# Patient Record
Sex: MALE | Race: WHITE | NOT HISPANIC OR LATINO | Employment: OTHER | URBAN - METROPOLITAN AREA
[De-identification: names, ages, dates, MRNs, and addresses within clinical notes are randomized per-mention and may not be internally consistent; named-entity substitution may affect disease eponyms.]

---

## 2021-12-10 ENCOUNTER — TELEPHONE (OUTPATIENT)
Dept: FAMILY MEDICINE CLINIC | Facility: CLINIC | Age: 51
End: 2021-12-10

## 2021-12-12 ENCOUNTER — APPOINTMENT (OUTPATIENT)
Dept: ULTRASOUND IMAGING | Facility: HOSPITAL | Age: 51
End: 2021-12-12

## 2021-12-12 ENCOUNTER — HOSPITAL ENCOUNTER (EMERGENCY)
Facility: HOSPITAL | Age: 51
Discharge: HOME | End: 2021-12-12
Admitting: EMERGENCY MEDICINE

## 2021-12-12 VITALS
HEIGHT: 71 IN | DIASTOLIC BLOOD PRESSURE: 93 MMHG | OXYGEN SATURATION: 98 % | WEIGHT: 237.88 LBS | HEART RATE: 84 BPM | TEMPERATURE: 98 F | BODY MASS INDEX: 33.3 KG/M2 | SYSTOLIC BLOOD PRESSURE: 122 MMHG | RESPIRATION RATE: 15 BRPM

## 2021-12-12 DIAGNOSIS — N45.1 LEFT EPIDIDYMITIS: Primary | ICD-10-CM

## 2021-12-12 LAB
ANION GAP SERPL CALCULATED.3IONS-SCNC: 12 MMOL/L (ref 5–15)
BACTERIA UR QL AUTO: ABNORMAL /HPF
BASOPHILS # BLD AUTO: 0.1 10*3/MM3 (ref 0–0.2)
BASOPHILS NFR BLD AUTO: 0.7 % (ref 0–1.5)
BILIRUB UR QL STRIP: NEGATIVE
BUN SERPL-MCNC: 13 MG/DL (ref 6–20)
BUN/CREAT SERPL: 15.3 (ref 7–25)
C TRACH RRNA CVX QL NAA+PROBE: NOT DETECTED
CALCIUM SPEC-SCNC: 8.8 MG/DL (ref 8.6–10.5)
CHLORIDE SERPL-SCNC: 101 MMOL/L (ref 98–107)
CLARITY UR: CLEAR
CO2 SERPL-SCNC: 26 MMOL/L (ref 22–29)
COLOR UR: YELLOW
CREAT SERPL-MCNC: 0.85 MG/DL (ref 0.76–1.27)
DEPRECATED RDW RBC AUTO: 41.6 FL (ref 37–54)
EOSINOPHIL # BLD AUTO: 0.2 10*3/MM3 (ref 0–0.4)
EOSINOPHIL NFR BLD AUTO: 1.6 % (ref 0.3–6.2)
ERYTHROCYTE [DISTWIDTH] IN BLOOD BY AUTOMATED COUNT: 14.1 % (ref 12.3–15.4)
GFR SERPL CREATININE-BSD FRML MDRD: 95 ML/MIN/1.73
GLUCOSE SERPL-MCNC: 182 MG/DL (ref 65–99)
GLUCOSE UR STRIP-MCNC: ABNORMAL MG/DL
HCT VFR BLD AUTO: 41.4 % (ref 37.5–51)
HGB BLD-MCNC: 14 G/DL (ref 13–17.7)
HGB UR QL STRIP.AUTO: ABNORMAL
HYALINE CASTS UR QL AUTO: ABNORMAL /LPF
KETONES UR QL STRIP: NEGATIVE
LEUKOCYTE ESTERASE UR QL STRIP.AUTO: ABNORMAL
LYMPHOCYTES # BLD AUTO: 2.6 10*3/MM3 (ref 0.7–3.1)
LYMPHOCYTES NFR BLD AUTO: 20 % (ref 19.6–45.3)
MCH RBC QN AUTO: 28.2 PG (ref 26.6–33)
MCHC RBC AUTO-ENTMCNC: 33.8 G/DL (ref 31.5–35.7)
MCV RBC AUTO: 83.5 FL (ref 79–97)
MONOCYTES # BLD AUTO: 1.2 10*3/MM3 (ref 0.1–0.9)
MONOCYTES NFR BLD AUTO: 9.1 % (ref 5–12)
N GONORRHOEA RRNA SPEC QL NAA+PROBE: NOT DETECTED
NEUTROPHILS NFR BLD AUTO: 68.6 % (ref 42.7–76)
NEUTROPHILS NFR BLD AUTO: 8.7 10*3/MM3 (ref 1.7–7)
NITRITE UR QL STRIP: NEGATIVE
NRBC BLD AUTO-RTO: 0.1 /100 WBC (ref 0–0.2)
PH UR STRIP.AUTO: 6 [PH] (ref 5–8)
PLATELET # BLD AUTO: 242 10*3/MM3 (ref 140–450)
PMV BLD AUTO: 7.6 FL (ref 6–12)
POTASSIUM SERPL-SCNC: 4 MMOL/L (ref 3.5–5.2)
PROT UR QL STRIP: ABNORMAL
RBC # BLD AUTO: 4.96 10*6/MM3 (ref 4.14–5.8)
RBC # UR STRIP: ABNORMAL /HPF
REF LAB TEST METHOD: ABNORMAL
SODIUM SERPL-SCNC: 139 MMOL/L (ref 136–145)
SP GR UR STRIP: 1.02 (ref 1–1.03)
SQUAMOUS #/AREA URNS HPF: ABNORMAL /HPF
UROBILINOGEN UR QL STRIP: ABNORMAL
WBC # UR STRIP: ABNORMAL /HPF
WBC NRBC COR # BLD: 12.8 10*3/MM3 (ref 3.4–10.8)

## 2021-12-12 PROCEDURE — 0 MORPHINE SULFATE 4 MG/ML SOLUTION: Performed by: NURSE PRACTITIONER

## 2021-12-12 PROCEDURE — 80048 BASIC METABOLIC PNL TOTAL CA: CPT | Performed by: NURSE PRACTITIONER

## 2021-12-12 PROCEDURE — 81001 URINALYSIS AUTO W/SCOPE: CPT | Performed by: NURSE PRACTITIONER

## 2021-12-12 PROCEDURE — 96374 THER/PROPH/DIAG INJ IV PUSH: CPT

## 2021-12-12 PROCEDURE — 99283 EMERGENCY DEPT VISIT LOW MDM: CPT

## 2021-12-12 PROCEDURE — 87591 N.GONORRHOEAE DNA AMP PROB: CPT | Performed by: NURSE PRACTITIONER

## 2021-12-12 PROCEDURE — 87491 CHLMYD TRACH DNA AMP PROBE: CPT | Performed by: NURSE PRACTITIONER

## 2021-12-12 PROCEDURE — 87086 URINE CULTURE/COLONY COUNT: CPT | Performed by: NURSE PRACTITIONER

## 2021-12-12 PROCEDURE — 25010000002 ONDANSETRON PER 1 MG: Performed by: NURSE PRACTITIONER

## 2021-12-12 PROCEDURE — 96375 TX/PRO/DX INJ NEW DRUG ADDON: CPT

## 2021-12-12 PROCEDURE — 93976 VASCULAR STUDY: CPT

## 2021-12-12 PROCEDURE — 76870 US EXAM SCROTUM: CPT

## 2021-12-12 PROCEDURE — 85025 COMPLETE CBC W/AUTO DIFF WBC: CPT | Performed by: NURSE PRACTITIONER

## 2021-12-12 RX ORDER — HYDROCODONE BITARTRATE AND ACETAMINOPHEN 5; 325 MG/1; MG/1
1 TABLET ORAL EVERY 4 HOURS PRN
Qty: 15 TABLET | Refills: 0 | Status: SHIPPED | OUTPATIENT
Start: 2021-12-12

## 2021-12-12 RX ORDER — SODIUM CHLORIDE 0.9 % (FLUSH) 0.9 %
10 SYRINGE (ML) INJECTION AS NEEDED
Status: DISCONTINUED | OUTPATIENT
Start: 2021-12-12 | End: 2021-12-12 | Stop reason: HOSPADM

## 2021-12-12 RX ORDER — ONDANSETRON 2 MG/ML
4 INJECTION INTRAMUSCULAR; INTRAVENOUS ONCE
Status: COMPLETED | OUTPATIENT
Start: 2021-12-12 | End: 2021-12-12

## 2021-12-12 RX ORDER — LEVOFLOXACIN 750 MG/1
750 TABLET ORAL
Status: COMPLETED | OUTPATIENT
Start: 2021-12-12 | End: 2021-12-12

## 2021-12-12 RX ORDER — MORPHINE SULFATE 4 MG/ML
4 INJECTION, SOLUTION INTRAMUSCULAR; INTRAVENOUS ONCE
Status: COMPLETED | OUTPATIENT
Start: 2021-12-12 | End: 2021-12-12

## 2021-12-12 RX ORDER — HYDROCODONE BITARTRATE AND ACETAMINOPHEN 7.5; 325 MG/1; MG/1
1 TABLET ORAL ONCE
Status: COMPLETED | OUTPATIENT
Start: 2021-12-12 | End: 2021-12-12

## 2021-12-12 RX ORDER — CIPROFLOXACIN 500 MG/1
500 TABLET, FILM COATED ORAL 2 TIMES DAILY
Qty: 20 TABLET | Refills: 0 | Status: SHIPPED | OUTPATIENT
Start: 2021-12-12

## 2021-12-12 RX ADMIN — MORPHINE SULFATE 4 MG: 4 INJECTION INTRAVENOUS at 05:03

## 2021-12-12 RX ADMIN — HYDROCODONE BITARTRATE AND ACETAMINOPHEN 1 TABLET: 7.5; 325 TABLET ORAL at 07:03

## 2021-12-12 RX ADMIN — ONDANSETRON 4 MG: 2 INJECTION INTRAMUSCULAR; INTRAVENOUS at 05:03

## 2021-12-12 RX ADMIN — LEVOFLOXACIN 750 MG: 750 TABLET, FILM COATED ORAL at 07:02

## 2021-12-13 LAB — BACTERIA SPEC AEROBE CULT: NO GROWTH

## 2021-12-20 ENCOUNTER — OFFICE VISIT (OUTPATIENT)
Dept: FAMILY MEDICINE CLINIC | Facility: CLINIC | Age: 51
End: 2021-12-20

## 2021-12-20 VITALS
HEART RATE: 87 BPM | SYSTOLIC BLOOD PRESSURE: 123 MMHG | BODY MASS INDEX: 33.33 KG/M2 | TEMPERATURE: 97.3 F | DIASTOLIC BLOOD PRESSURE: 83 MMHG | WEIGHT: 239 LBS | OXYGEN SATURATION: 98 %

## 2021-12-20 DIAGNOSIS — N45.1 EPIDIDYMITIS, LEFT: Primary | ICD-10-CM

## 2021-12-20 PROCEDURE — 99213 OFFICE O/P EST LOW 20 MIN: CPT | Performed by: FAMILY MEDICINE

## 2024-05-08 ENCOUNTER — OFFICE VISIT (OUTPATIENT)
Dept: FAMILY MEDICINE CLINIC | Facility: CLINIC | Age: 54
End: 2024-05-08
Payer: COMMERCIAL

## 2024-05-08 VITALS
TEMPERATURE: 98 F | DIASTOLIC BLOOD PRESSURE: 89 MMHG | HEART RATE: 82 BPM | SYSTOLIC BLOOD PRESSURE: 126 MMHG | OXYGEN SATURATION: 96 % | WEIGHT: 221 LBS | BODY MASS INDEX: 30.82 KG/M2

## 2024-05-08 DIAGNOSIS — R10.13 EPIGASTRIC PAIN: ICD-10-CM

## 2024-05-08 DIAGNOSIS — T78.40XA ALLERGY, INITIAL ENCOUNTER: Primary | ICD-10-CM

## 2024-05-08 PROCEDURE — 99214 OFFICE O/P EST MOD 30 MIN: CPT | Performed by: NURSE PRACTITIONER

## 2024-05-08 RX ORDER — ALBUTEROL SULFATE 90 UG/1
2 AEROSOL, METERED RESPIRATORY (INHALATION) AS NEEDED
COMMUNITY
Start: 2016-05-18

## 2024-05-08 RX ORDER — OMEPRAZOLE 40 MG/1
40 CAPSULE, DELAYED RELEASE ORAL DAILY
Qty: 90 CAPSULE | Refills: 1 | Status: SHIPPED | OUTPATIENT
Start: 2024-05-08

## 2024-05-08 RX ORDER — EPINEPHRINE 0.3 MG/.3ML
0.3 INJECTION SUBCUTANEOUS ONCE
Qty: 1 EACH | Refills: 0 | Status: SHIPPED | OUTPATIENT
Start: 2024-05-08 | End: 2024-05-08

## 2024-05-08 RX ORDER — MONTELUKAST SODIUM 10 MG/1
10 TABLET ORAL NIGHTLY
Qty: 90 TABLET | Refills: 1 | Status: SHIPPED | OUTPATIENT
Start: 2024-05-08

## 2024-05-08 RX ORDER — LISINOPRIL 10 MG/1
10 TABLET ORAL DAILY
COMMUNITY
Start: 2017-10-16

## 2024-05-08 RX ORDER — LORAZEPAM 1 MG/1
1 TABLET ORAL AS NEEDED
COMMUNITY
Start: 2015-08-26

## 2024-05-08 RX ORDER — OLOPATADINE HYDROCHLORIDE 1 MG/ML
1 SOLUTION/ DROPS OPHTHALMIC 2 TIMES DAILY PRN
Qty: 5 ML | Refills: 12 | Status: SHIPPED | OUTPATIENT
Start: 2024-05-08

## 2024-05-18 ENCOUNTER — PATIENT MESSAGE (OUTPATIENT)
Dept: FAMILY MEDICINE CLINIC | Facility: CLINIC | Age: 54
End: 2024-05-18

## 2024-05-19 RX ORDER — ACYCLOVIR 400 MG/1
1 TABLET ORAL
Qty: 3 EACH | Refills: 5 | Status: SHIPPED | OUTPATIENT
Start: 2024-05-19

## 2024-05-29 ENCOUNTER — LAB (OUTPATIENT)
Dept: FAMILY MEDICINE CLINIC | Facility: CLINIC | Age: 54
End: 2024-05-29
Payer: COMMERCIAL

## 2024-05-29 ENCOUNTER — OFFICE VISIT (OUTPATIENT)
Dept: FAMILY MEDICINE CLINIC | Facility: CLINIC | Age: 54
End: 2024-05-29
Payer: COMMERCIAL

## 2024-05-29 VITALS
HEIGHT: 71 IN | DIASTOLIC BLOOD PRESSURE: 88 MMHG | WEIGHT: 222.3 LBS | BODY MASS INDEX: 31.12 KG/M2 | SYSTOLIC BLOOD PRESSURE: 136 MMHG | OXYGEN SATURATION: 97 % | HEART RATE: 75 BPM | TEMPERATURE: 98.3 F

## 2024-05-29 DIAGNOSIS — Z12.5 ENCOUNTER FOR SCREENING FOR MALIGNANT NEOPLASM OF PROSTATE: ICD-10-CM

## 2024-05-29 DIAGNOSIS — Z00.00 ENCOUNTER FOR WELL ADULT EXAM WITHOUT ABNORMAL FINDINGS: Primary | ICD-10-CM

## 2024-05-29 DIAGNOSIS — Z12.11 SCREENING FOR COLON CANCER: ICD-10-CM

## 2024-05-29 DIAGNOSIS — E11.9 TYPE 2 DIABETES MELLITUS WITHOUT COMPLICATION, WITHOUT LONG-TERM CURRENT USE OF INSULIN: ICD-10-CM

## 2024-05-29 LAB
ALBUMIN SERPL-MCNC: 4.5 G/DL (ref 3.5–5.2)
ALBUMIN/GLOB SERPL: 1.5 G/DL
ALP SERPL-CCNC: 80 U/L (ref 39–117)
ALT SERPL W P-5'-P-CCNC: 13 U/L (ref 1–41)
ANION GAP SERPL CALCULATED.3IONS-SCNC: 13 MMOL/L (ref 5–15)
AST SERPL-CCNC: 11 U/L (ref 1–40)
BILIRUB SERPL-MCNC: 0.2 MG/DL (ref 0–1.2)
BUN SERPL-MCNC: 10 MG/DL (ref 6–20)
BUN/CREAT SERPL: 10.8 (ref 7–25)
CALCIUM SPEC-SCNC: 9.8 MG/DL (ref 8.6–10.5)
CHLORIDE SERPL-SCNC: 100 MMOL/L (ref 98–107)
CHOLEST SERPL-MCNC: 203 MG/DL (ref 0–200)
CO2 SERPL-SCNC: 27 MMOL/L (ref 22–29)
CREAT SERPL-MCNC: 0.93 MG/DL (ref 0.76–1.27)
EGFRCR SERPLBLD CKD-EPI 2021: 98.8 ML/MIN/1.73
GLOBULIN UR ELPH-MCNC: 3.1 GM/DL
GLUCOSE SERPL-MCNC: 142 MG/DL (ref 65–99)
HBA1C MFR BLD: 6.7 % (ref 4.8–5.6)
HCV AB SER QL: NORMAL
HDLC SERPL-MCNC: 33 MG/DL (ref 40–60)
LDLC SERPL CALC-MCNC: 145 MG/DL (ref 0–100)
LDLC/HDLC SERPL: 4.32 {RATIO}
POTASSIUM SERPL-SCNC: 4.2 MMOL/L (ref 3.5–5.2)
PROT SERPL-MCNC: 7.6 G/DL (ref 6–8.5)
PSA SERPL-MCNC: 0.41 NG/ML (ref 0–4)
SODIUM SERPL-SCNC: 140 MMOL/L (ref 136–145)
TRIGL SERPL-MCNC: 138 MG/DL (ref 0–150)
VLDLC SERPL-MCNC: 25 MG/DL (ref 5–40)

## 2024-05-29 PROCEDURE — G0103 PSA SCREENING: HCPCS | Performed by: FAMILY MEDICINE

## 2024-05-29 PROCEDURE — 84403 ASSAY OF TOTAL TESTOSTERONE: CPT | Performed by: FAMILY MEDICINE

## 2024-05-29 PROCEDURE — 83036 HEMOGLOBIN GLYCOSYLATED A1C: CPT | Performed by: FAMILY MEDICINE

## 2024-05-29 PROCEDURE — 99396 PREV VISIT EST AGE 40-64: CPT | Performed by: FAMILY MEDICINE

## 2024-05-29 PROCEDURE — 80053 COMPREHEN METABOLIC PANEL: CPT | Performed by: FAMILY MEDICINE

## 2024-05-29 PROCEDURE — 36415 COLL VENOUS BLD VENIPUNCTURE: CPT | Performed by: FAMILY MEDICINE

## 2024-05-29 PROCEDURE — 86803 HEPATITIS C AB TEST: CPT | Performed by: FAMILY MEDICINE

## 2024-05-29 PROCEDURE — 80061 LIPID PANEL: CPT | Performed by: FAMILY MEDICINE

## 2024-05-29 PROCEDURE — 90471 IMMUNIZATION ADMIN: CPT | Performed by: FAMILY MEDICINE

## 2024-05-29 PROCEDURE — 84402 ASSAY OF FREE TESTOSTERONE: CPT | Performed by: FAMILY MEDICINE

## 2024-05-29 PROCEDURE — 90715 TDAP VACCINE 7 YRS/> IM: CPT | Performed by: FAMILY MEDICINE

## 2024-05-29 RX ORDER — EPINEPHRINE 0.3 MG/.3ML
INJECTION SUBCUTANEOUS
COMMUNITY
Start: 2024-05-08

## 2024-06-04 LAB
TESTOST FREE SERPL-MCNC: 7 PG/ML (ref 7.2–24)
TESTOST SERPL-MCNC: 273.2 NG/DL (ref 264–916)

## 2025-01-31 ENCOUNTER — APPOINTMENT (OUTPATIENT)
Dept: CT IMAGING | Facility: HOSPITAL | Age: 55
End: 2025-01-31

## 2025-01-31 ENCOUNTER — HOSPITAL ENCOUNTER (OUTPATIENT)
Facility: HOSPITAL | Age: 55
Setting detail: OBSERVATION
Discharge: HOME | End: 2025-02-01
Attending: EMERGENCY MEDICINE | Admitting: EMERGENCY MEDICINE

## 2025-01-31 DIAGNOSIS — R11.0 NAUSEA: ICD-10-CM

## 2025-01-31 DIAGNOSIS — R10.13 EPIGASTRIC ABDOMINAL PAIN: ICD-10-CM

## 2025-01-31 DIAGNOSIS — K80.20 CALCULUS OF GALLBLADDER WITHOUT CHOLECYSTITIS WITHOUT OBSTRUCTION: Primary | ICD-10-CM

## 2025-01-31 LAB
ALBUMIN SERPL-MCNC: 4.5 G/DL (ref 3.5–5.2)
ALBUMIN/GLOB SERPL: 1.3 G/DL
ALP SERPL-CCNC: 84 U/L (ref 39–117)
ALT SERPL W P-5'-P-CCNC: 14 U/L (ref 1–41)
ANION GAP SERPL CALCULATED.3IONS-SCNC: 13.9 MMOL/L (ref 5–15)
AST SERPL-CCNC: 21 U/L (ref 1–40)
BASOPHILS # BLD AUTO: 0.1 10*3/MM3 (ref 0–0.2)
BASOPHILS NFR BLD AUTO: 0.8 % (ref 0–1.5)
BILIRUB SERPL-MCNC: 0.4 MG/DL (ref 0–1.2)
BILIRUB UR QL STRIP: NEGATIVE
BUN SERPL-MCNC: 11 MG/DL (ref 6–20)
BUN/CREAT SERPL: 10.7 (ref 7–25)
CALCIUM SPEC-SCNC: 10 MG/DL (ref 8.6–10.5)
CHLORIDE SERPL-SCNC: 98 MMOL/L (ref 98–107)
CLARITY UR: CLEAR
CO2 SERPL-SCNC: 24.1 MMOL/L (ref 22–29)
COLOR UR: YELLOW
CREAT SERPL-MCNC: 1.03 MG/DL (ref 0.76–1.27)
DEPRECATED RDW RBC AUTO: 40.9 FL (ref 37–54)
EGFRCR SERPLBLD CKD-EPI 2021: 86.9 ML/MIN/1.73
EOSINOPHIL # BLD AUTO: 0.12 10*3/MM3 (ref 0–0.4)
EOSINOPHIL NFR BLD AUTO: 1 % (ref 0.3–6.2)
ERYTHROCYTE [DISTWIDTH] IN BLOOD BY AUTOMATED COUNT: 13.2 % (ref 12.3–15.4)
GEN 5 1HR TROPONIN T REFLEX: 10 NG/L
GLOBULIN UR ELPH-MCNC: 3.4 GM/DL
GLUCOSE BLDC GLUCOMTR-MCNC: 118 MG/DL (ref 70–105)
GLUCOSE BLDC GLUCOMTR-MCNC: 146 MG/DL (ref 70–105)
GLUCOSE BLDC GLUCOMTR-MCNC: 162 MG/DL (ref 70–105)
GLUCOSE SERPL-MCNC: 204 MG/DL (ref 65–99)
GLUCOSE UR STRIP-MCNC: NEGATIVE MG/DL
HBA1C MFR BLD: 7.43 % (ref 4.8–5.6)
HCT VFR BLD AUTO: 45.2 % (ref 37.5–51)
HGB BLD-MCNC: 15 G/DL (ref 13–17.7)
HGB UR QL STRIP.AUTO: NEGATIVE
HOLD SPECIMEN: NORMAL
HOLD SPECIMEN: NORMAL
IMM GRANULOCYTES # BLD AUTO: 0.03 10*3/MM3 (ref 0–0.05)
IMM GRANULOCYTES NFR BLD AUTO: 0.2 % (ref 0–0.5)
KETONES UR QL STRIP: NEGATIVE
LEUKOCYTE ESTERASE UR QL STRIP.AUTO: NEGATIVE
LIPASE SERPL-CCNC: 19 U/L (ref 13–60)
LYMPHOCYTES # BLD AUTO: 1.37 10*3/MM3 (ref 0.7–3.1)
LYMPHOCYTES NFR BLD AUTO: 11.1 % (ref 19.6–45.3)
MCH RBC QN AUTO: 28 PG (ref 26.6–33)
MCHC RBC AUTO-ENTMCNC: 33.2 G/DL (ref 31.5–35.7)
MCV RBC AUTO: 84.3 FL (ref 79–97)
MONOCYTES # BLD AUTO: 0.81 10*3/MM3 (ref 0.1–0.9)
MONOCYTES NFR BLD AUTO: 6.6 % (ref 5–12)
NEUTROPHILS NFR BLD AUTO: 80.3 % (ref 42.7–76)
NEUTROPHILS NFR BLD AUTO: 9.86 10*3/MM3 (ref 1.7–7)
NITRITE UR QL STRIP: NEGATIVE
NRBC BLD AUTO-RTO: 0 /100 WBC (ref 0–0.2)
PH UR STRIP.AUTO: 7 [PH] (ref 5–8)
PLATELET # BLD AUTO: 264 10*3/MM3 (ref 140–450)
PMV BLD AUTO: 9.6 FL (ref 6–12)
POTASSIUM SERPL-SCNC: 4.4 MMOL/L (ref 3.5–5.2)
PROT SERPL-MCNC: 7.9 G/DL (ref 6–8.5)
PROT UR QL STRIP: NEGATIVE
RBC # BLD AUTO: 5.36 10*6/MM3 (ref 4.14–5.8)
SODIUM SERPL-SCNC: 136 MMOL/L (ref 136–145)
SP GR UR STRIP: 1.03 (ref 1–1.03)
TROPONIN T NUMERIC DELTA: 3 NG/L
TROPONIN T SERPL HS-MCNC: 7 NG/L
UROBILINOGEN UR QL STRIP: ABNORMAL
WBC NRBC COR # BLD AUTO: 12.29 10*3/MM3 (ref 3.4–10.8)
WHOLE BLOOD HOLD COAG: NORMAL
WHOLE BLOOD HOLD SPECIMEN: NORMAL

## 2025-01-31 PROCEDURE — 96376 TX/PRO/DX INJ SAME DRUG ADON: CPT

## 2025-01-31 PROCEDURE — 25010000002 HYDROMORPHONE PER 4 MG: Performed by: NURSE PRACTITIONER

## 2025-01-31 PROCEDURE — 96368 THER/DIAG CONCURRENT INF: CPT

## 2025-01-31 PROCEDURE — G0378 HOSPITAL OBSERVATION PER HR: HCPCS

## 2025-01-31 PROCEDURE — 25010000002 HYDROMORPHONE PER 4 MG: Performed by: PHYSICIAN ASSISTANT

## 2025-01-31 PROCEDURE — 80053 COMPREHEN METABOLIC PANEL: CPT | Performed by: PHYSICIAN ASSISTANT

## 2025-01-31 PROCEDURE — 93005 ELECTROCARDIOGRAM TRACING: CPT | Performed by: PHYSICIAN ASSISTANT

## 2025-01-31 PROCEDURE — 99204 OFFICE O/P NEW MOD 45 MIN: CPT | Performed by: STUDENT IN AN ORGANIZED HEALTH CARE EDUCATION/TRAINING PROGRAM

## 2025-01-31 PROCEDURE — 25010000002 HYDROMORPHONE 1 MG/ML SOLUTION: Performed by: PHYSICIAN ASSISTANT

## 2025-01-31 PROCEDURE — 96375 TX/PRO/DX INJ NEW DRUG ADDON: CPT

## 2025-01-31 PROCEDURE — 25010000002 CEFTRIAXONE PER 250 MG: Performed by: STUDENT IN AN ORGANIZED HEALTH CARE EDUCATION/TRAINING PROGRAM

## 2025-01-31 PROCEDURE — 85025 COMPLETE CBC W/AUTO DIFF WBC: CPT | Performed by: PHYSICIAN ASSISTANT

## 2025-01-31 PROCEDURE — 74177 CT ABD & PELVIS W/CONTRAST: CPT

## 2025-01-31 PROCEDURE — 81003 URINALYSIS AUTO W/O SCOPE: CPT | Performed by: PHYSICIAN ASSISTANT

## 2025-01-31 PROCEDURE — 25010000002 PROCHLORPERAZINE 10 MG/2ML SOLUTION: Performed by: PHYSICIAN ASSISTANT

## 2025-01-31 PROCEDURE — 25510000001 IOPAMIDOL PER 1 ML: Performed by: PHYSICIAN ASSISTANT

## 2025-01-31 PROCEDURE — 36415 COLL VENOUS BLD VENIPUNCTURE: CPT

## 2025-01-31 PROCEDURE — 83036 HEMOGLOBIN GLYCOSYLATED A1C: CPT | Performed by: NURSE PRACTITIONER

## 2025-01-31 PROCEDURE — 25010000002 METRONIDAZOLE 500 MG/100ML SOLUTION: Performed by: STUDENT IN AN ORGANIZED HEALTH CARE EDUCATION/TRAINING PROGRAM

## 2025-01-31 PROCEDURE — 25010000002 ONDANSETRON PER 1 MG: Performed by: PHYSICIAN ASSISTANT

## 2025-01-31 PROCEDURE — 82948 REAGENT STRIP/BLOOD GLUCOSE: CPT

## 2025-01-31 PROCEDURE — 83690 ASSAY OF LIPASE: CPT | Performed by: PHYSICIAN ASSISTANT

## 2025-01-31 PROCEDURE — 84484 ASSAY OF TROPONIN QUANT: CPT | Performed by: PHYSICIAN ASSISTANT

## 2025-01-31 PROCEDURE — 99285 EMERGENCY DEPT VISIT HI MDM: CPT

## 2025-01-31 PROCEDURE — 25010000002 DIPHENHYDRAMINE PER 50 MG: Performed by: PHYSICIAN ASSISTANT

## 2025-01-31 PROCEDURE — 96365 THER/PROPH/DIAG IV INF INIT: CPT

## 2025-01-31 RX ORDER — AMOXICILLIN 250 MG
2 CAPSULE ORAL 2 TIMES DAILY PRN
Status: DISCONTINUED | OUTPATIENT
Start: 2025-01-31 | End: 2025-02-01 | Stop reason: HOSPADM

## 2025-01-31 RX ORDER — HYDROMORPHONE HYDROCHLORIDE 1 MG/ML
0.5 INJECTION, SOLUTION INTRAMUSCULAR; INTRAVENOUS; SUBCUTANEOUS
Status: DISCONTINUED | OUTPATIENT
Start: 2025-01-31 | End: 2025-01-31

## 2025-01-31 RX ORDER — ACETAMINOPHEN 160 MG/5ML
650 SOLUTION ORAL EVERY 4 HOURS PRN
Status: DISCONTINUED | OUTPATIENT
Start: 2025-01-31 | End: 2025-02-01 | Stop reason: HOSPADM

## 2025-01-31 RX ORDER — IBUPROFEN 600 MG/1
1 TABLET ORAL
Status: DISCONTINUED | OUTPATIENT
Start: 2025-01-31 | End: 2025-02-01 | Stop reason: HOSPADM

## 2025-01-31 RX ORDER — BISACODYL 5 MG/1
5 TABLET, DELAYED RELEASE ORAL DAILY PRN
Status: DISCONTINUED | OUTPATIENT
Start: 2025-01-31 | End: 2025-02-01 | Stop reason: HOSPADM

## 2025-01-31 RX ORDER — PROCHLORPERAZINE EDISYLATE 5 MG/ML
10 INJECTION INTRAMUSCULAR; INTRAVENOUS ONCE
Status: COMPLETED | OUTPATIENT
Start: 2025-01-31 | End: 2025-01-31

## 2025-01-31 RX ORDER — INSULIN LISPRO 100 [IU]/ML
2-9 INJECTION, SOLUTION INTRAVENOUS; SUBCUTANEOUS
Status: DISCONTINUED | OUTPATIENT
Start: 2025-01-31 | End: 2025-02-01 | Stop reason: HOSPADM

## 2025-01-31 RX ORDER — DIPHENHYDRAMINE HYDROCHLORIDE 50 MG/ML
25 INJECTION INTRAMUSCULAR; INTRAVENOUS ONCE
Status: COMPLETED | OUTPATIENT
Start: 2025-01-31 | End: 2025-01-31

## 2025-01-31 RX ORDER — SODIUM CHLORIDE 0.9 % (FLUSH) 0.9 %
10 SYRINGE (ML) INJECTION AS NEEDED
Status: DISCONTINUED | OUTPATIENT
Start: 2025-01-31 | End: 2025-02-01 | Stop reason: HOSPADM

## 2025-01-31 RX ORDER — ACETAMINOPHEN 650 MG/1
650 SUPPOSITORY RECTAL EVERY 4 HOURS PRN
Status: DISCONTINUED | OUTPATIENT
Start: 2025-01-31 | End: 2025-02-01 | Stop reason: HOSPADM

## 2025-01-31 RX ORDER — ACETAMINOPHEN 325 MG/1
650 TABLET ORAL EVERY 4 HOURS PRN
Status: DISCONTINUED | OUTPATIENT
Start: 2025-01-31 | End: 2025-02-01 | Stop reason: HOSPADM

## 2025-01-31 RX ORDER — DEXTROSE MONOHYDRATE 25 G/50ML
25 INJECTION, SOLUTION INTRAVENOUS
Status: DISCONTINUED | OUTPATIENT
Start: 2025-01-31 | End: 2025-02-01 | Stop reason: HOSPADM

## 2025-01-31 RX ORDER — METRONIDAZOLE 500 MG/100ML
500 INJECTION, SOLUTION INTRAVENOUS EVERY 8 HOURS
Status: DISCONTINUED | OUTPATIENT
Start: 2025-01-31 | End: 2025-02-01 | Stop reason: HOSPADM

## 2025-01-31 RX ORDER — SODIUM CHLORIDE 9 MG/ML
40 INJECTION, SOLUTION INTRAVENOUS AS NEEDED
Status: DISCONTINUED | OUTPATIENT
Start: 2025-01-31 | End: 2025-02-01 | Stop reason: HOSPADM

## 2025-01-31 RX ORDER — IOPAMIDOL 755 MG/ML
100 INJECTION, SOLUTION INTRAVASCULAR
Status: COMPLETED | OUTPATIENT
Start: 2025-01-31 | End: 2025-01-31

## 2025-01-31 RX ORDER — SODIUM CHLORIDE 0.9 % (FLUSH) 0.9 %
10 SYRINGE (ML) INJECTION EVERY 12 HOURS SCHEDULED
Status: DISCONTINUED | OUTPATIENT
Start: 2025-01-31 | End: 2025-02-01 | Stop reason: HOSPADM

## 2025-01-31 RX ORDER — NITROGLYCERIN 0.4 MG/1
0.4 TABLET SUBLINGUAL
Status: DISCONTINUED | OUTPATIENT
Start: 2025-01-31 | End: 2025-02-01 | Stop reason: HOSPADM

## 2025-01-31 RX ORDER — ONDANSETRON 2 MG/ML
4 INJECTION INTRAMUSCULAR; INTRAVENOUS EVERY 6 HOURS PRN
Status: DISCONTINUED | OUTPATIENT
Start: 2025-01-31 | End: 2025-02-01 | Stop reason: HOSPADM

## 2025-01-31 RX ORDER — ONDANSETRON 2 MG/ML
4 INJECTION INTRAMUSCULAR; INTRAVENOUS ONCE
Status: COMPLETED | OUTPATIENT
Start: 2025-01-31 | End: 2025-01-31

## 2025-01-31 RX ORDER — KETOROLAC TROMETHAMINE 30 MG/ML
15 INJECTION, SOLUTION INTRAMUSCULAR; INTRAVENOUS EVERY 6 HOURS PRN
Status: DISCONTINUED | OUTPATIENT
Start: 2025-01-31 | End: 2025-02-01 | Stop reason: HOSPADM

## 2025-01-31 RX ORDER — HYDROMORPHONE HYDROCHLORIDE 1 MG/ML
0.5 INJECTION, SOLUTION INTRAMUSCULAR; INTRAVENOUS; SUBCUTANEOUS
Status: DISCONTINUED | OUTPATIENT
Start: 2025-01-31 | End: 2025-02-01 | Stop reason: HOSPADM

## 2025-01-31 RX ORDER — BISACODYL 10 MG
10 SUPPOSITORY, RECTAL RECTAL DAILY PRN
Status: DISCONTINUED | OUTPATIENT
Start: 2025-01-31 | End: 2025-02-01 | Stop reason: HOSPADM

## 2025-01-31 RX ORDER — ALUMINA, MAGNESIA, AND SIMETHICONE 2400; 2400; 240 MG/30ML; MG/30ML; MG/30ML
15 SUSPENSION ORAL EVERY 6 HOURS PRN
Status: DISCONTINUED | OUTPATIENT
Start: 2025-01-31 | End: 2025-02-01 | Stop reason: HOSPADM

## 2025-01-31 RX ORDER — HYDROMORPHONE HYDROCHLORIDE 1 MG/ML
0.5 INJECTION, SOLUTION INTRAMUSCULAR; INTRAVENOUS; SUBCUTANEOUS ONCE
Status: COMPLETED | OUTPATIENT
Start: 2025-01-31 | End: 2025-01-31

## 2025-01-31 RX ORDER — INDOCYANINE GREEN AND WATER 25 MG
2.5 KIT INJECTION ONCE
Status: COMPLETED | OUTPATIENT
Start: 2025-02-01 | End: 2025-02-01

## 2025-01-31 RX ORDER — NICOTINE POLACRILEX 4 MG
15 LOZENGE BUCCAL
Status: DISCONTINUED | OUTPATIENT
Start: 2025-01-31 | End: 2025-02-01 | Stop reason: HOSPADM

## 2025-01-31 RX ORDER — POLYETHYLENE GLYCOL 3350 17 G/17G
17 POWDER, FOR SOLUTION ORAL DAILY PRN
Status: DISCONTINUED | OUTPATIENT
Start: 2025-01-31 | End: 2025-02-01 | Stop reason: HOSPADM

## 2025-01-31 RX ORDER — ONDANSETRON 4 MG/1
4 TABLET, ORALLY DISINTEGRATING ORAL EVERY 6 HOURS PRN
Status: DISCONTINUED | OUTPATIENT
Start: 2025-01-31 | End: 2025-02-01 | Stop reason: HOSPADM

## 2025-01-31 RX ADMIN — HYDROMORPHONE HYDROCHLORIDE 0.5 MG: 1 INJECTION, SOLUTION INTRAMUSCULAR; INTRAVENOUS; SUBCUTANEOUS at 15:26

## 2025-01-31 RX ADMIN — DIPHENHYDRAMINE HYDROCHLORIDE 25 MG: 50 INJECTION INTRAMUSCULAR; INTRAVENOUS at 10:23

## 2025-01-31 RX ADMIN — HYDROMORPHONE HYDROCHLORIDE 0.5 MG: 1 INJECTION, SOLUTION INTRAMUSCULAR; INTRAVENOUS; SUBCUTANEOUS at 12:38

## 2025-01-31 RX ADMIN — HYDROMORPHONE HYDROCHLORIDE 0.5 MG: 1 INJECTION, SOLUTION INTRAMUSCULAR; INTRAVENOUS; SUBCUTANEOUS at 09:37

## 2025-01-31 RX ADMIN — IOPAMIDOL 100 ML: 755 INJECTION, SOLUTION INTRAVENOUS at 10:38

## 2025-01-31 RX ADMIN — CEFTRIAXONE 2000 MG: 2 INJECTION, POWDER, FOR SOLUTION INTRAMUSCULAR; INTRAVENOUS at 20:32

## 2025-01-31 RX ADMIN — Medication 10 ML: at 20:33

## 2025-01-31 RX ADMIN — METRONIDAZOLE 500 MG: 500 INJECTION, SOLUTION INTRAVENOUS at 20:32

## 2025-01-31 RX ADMIN — ONDANSETRON 4 MG: 2 INJECTION INTRAMUSCULAR; INTRAVENOUS at 09:36

## 2025-01-31 RX ADMIN — PROCHLORPERAZINE EDISYLATE 10 MG: 5 INJECTION INTRAMUSCULAR; INTRAVENOUS at 10:25

## 2025-01-31 NOTE — ED NOTES
Nursing report ED to floor  Pablo Solano  54 y.o.  male    HPI:   Chief Complaint   Patient presents with    Abdominal Pain     C/o upper abdominal pain today, n/v       Admitting doctor:   Ilia Ibarra MD    Admitting diagnosis:   The primary encounter diagnosis was Calculus of gallbladder without cholecystitis without obstruction. Diagnoses of Epigastric abdominal pain and Nausea were also pertinent to this visit.    Code status:   Current Code Status       Date Active Code Status Order ID Comments User Context       1/31/2025 1341 CPR (Attempt to Resuscitate) [id number]  Loer Madrigal APRN ED        Question Answer    Code Status (Patient has no pulse and is not breathing) CPR (Attempt to Resuscitate)    Medical Interventions (Patient has pulse or is breathing) Full Support    Level Of Support Discussed With Patient                    Allergies:   Patient has no known allergies.    Isolation:  No active isolations     Fall Risk:  Fall Risk Assessment was completed, and patient is at low risk for falls.   Predictive Model Details         8 (Low) Factor Value    Calculated 1/31/2025 14:17 Age 54    Risk of Fall Model Active Peripheral IV Present     Imaging order in this encounter Present     Respiratory Rate 18     Number of Distinct Medication Classes administered 5     Magnesium not on file     Diastolic BP 69     Tobacco Use Current     Kieran Scale not on file     Chloride 98 mmol/L     Number of administrations of Analgesic Narcotics 2     Clinically Relevant Sex Not Female     Cardiac Assessment X     Gastrointestinal Assessment X     Days after Admission 0.221     Potassium 4.4 mmol/L     Total Bilirubin 0.4 mg/dL     Albumin 4.5 g/dL     Creatinine 1.03 mg/dL     Calcium 10 mg/dL     ALT 14 U/L         Weight:       01/31/25  0848   Weight: 106 kg (233 lb 11 oz)       Intake and Output  No intake or output data in the 24 hours ending 01/31/25 1418    Diet:        Most recent vitals:   Vitals:     "01/31/25 0848 01/31/25 0914 01/31/25 0955 01/31/25 1410   BP: 173/83  139/97 119/69   BP Location: Left arm      Patient Position: Sitting      Pulse: 84  85 72   Resp: 21  20 18   Temp:  97.5 °F (36.4 °C)     TempSrc:  Oral     SpO2: 99%  97% 97%   Weight: 106 kg (233 lb 11 oz)      Height: 177.8 cm (70\")          Active LDAs/IV Access:   Lines, Drains & Airways       Active LDAs       Name Placement date Placement time Site Days    Peripheral IV 01/31/25 0919 Right Antecubital 01/31/25 0919  Antecubital  less than 1                    Skin Condition:   Skin Assessments (last day)       None             Labs (abnormal labs have a star):   Labs Reviewed   COMPREHENSIVE METABOLIC PANEL - Abnormal; Notable for the following components:       Result Value    Glucose 204 (*)     All other components within normal limits    Narrative:     GFR Categories in Chronic Kidney Disease (CKD)      GFR Category          GFR (mL/min/1.73)    Interpretation  G1                     90 or greater         Normal or high (1)  G2                      60-89                Mild decrease (1)  G3a                   45-59                Mild to moderate decrease  G3b                   30-44                Moderate to severe decrease  G4                    15-29                Severe decrease  G5                    14 or less           Kidney failure          (1)In the absence of evidence of kidney disease, neither GFR category G1 or G2 fulfill the criteria for CKD.    eGFR calculation 2021 CKD-EPI creatinine equation, which does not include race as a factor   CBC WITH AUTO DIFFERENTIAL - Abnormal; Notable for the following components:    WBC 12.29 (*)     Neutrophil % 80.3 (*)     Lymphocyte % 11.1 (*)     Neutrophils, Absolute 9.86 (*)     All other components within normal limits   HIGH SENSITIVITIY TROPONIN T 1HR - Abnormal; Notable for the following components:    Troponin T Numeric Delta 3 (*)     All other components within normal " limits    Narrative:     High Sensitive Troponin T Reference Range:  <14.0 ng/L- Negative Female for AMI  <22.0 ng/L- Negative Male for AMI  >=14 - Abnormal Female indicating possible myocardial injury.  >=22 - Abnormal Male indicating possible myocardial injury.   Clinicians would have to utilize clinical acumen, EKG, Troponin, and serial changes to determine if it is an Acute Myocardial Infarction or myocardial injury due to an underlying chronic condition.        HEMOGLOBIN A1C - Abnormal; Notable for the following components:    Hemoglobin A1C 7.43 (*)     All other components within normal limits   LIPASE - Normal   TROPONIN - Normal    Narrative:     High Sensitive Troponin T Reference Range:  <14.0 ng/L- Negative Female for AMI  <22.0 ng/L- Negative Male for AMI  >=14 - Abnormal Female indicating possible myocardial injury.  >=22 - Abnormal Male indicating possible myocardial injury.   Clinicians would have to utilize clinical acumen, EKG, Troponin, and serial changes to determine if it is an Acute Myocardial Infarction or myocardial injury due to an underlying chronic condition.        RAINBOW DRAW    Narrative:     The following orders were created for panel order Fellows Draw.  Procedure                               Abnormality         Status                     ---------                               -----------         ------                     Green Top (Gel)[[id number]]                                  Final result               Lavender Top[[id number]]                                     Final result               Gold Top - SST[[id number]]                                   Final result               Light Blue Top[[id number]]                                   Final result                 Please view results for these tests on the individual orders.   URINALYSIS W/ CULTURE IF INDICATED   GREEN TOP   LAVENDER TOP   GOLD TOP - SST   LIGHT BLUE TOP   CBC AND DIFFERENTIAL    Narrative:     The following  orders were created for panel order CBC & Differential.  Procedure                               Abnormality         Status                     ---------                               -----------         ------                     CBC Auto Differential[[id number]]        Abnormal            Final result                 Please view results for these tests on the individual orders.       LOC: Person, Place, Time, and Situation    Telemetry:  Observation Unit    Cardiac Monitoring Ordered: yes    EKG:   ECG 12 Lead   ED Interpretation   Ambar Hamilton PA     1/31/2025 12:57 PM   ECG 12 Lead         Date/Time: 1/31/2025 12:47 PM      Performed by: Ambar Hamilton PA   Authorized by: Ilia Ibarra MD  Interpreted by ED physician   Previous ECG: no previous ECG available   Rhythm: sinus rhythm   Rate: normal   BPM: 80   QRS axis: normal   Conduction: conduction normal   Other: no other findings   Clinical impression: non-specific ECG      ECG 12 Lead Chest Pain   Preliminary Result   HEART RATE=80  bpm   RR Gadijbgp=596  ms   SD Interval=160  ms   P Horizontal Axis=37  deg   P Front Axis=56  deg   QRSD Interval=86  ms   QT Fwyumwhz=660  ms   GUrX=024  ms   QRS Axis=36  deg   T Wave Axis=60  deg   - NORMAL ECG -   Sinus rhythm   Date and Time of Study:2025-01-31 09:29:08          Medications Given in the ED:   Medications   sodium chloride 0.9 % flush 10 mL (has no administration in time range)   sodium chloride 0.9 % flush 10 mL (has no administration in time range)   HYDROmorphone (DILAUDID) injection 0.5 mg (0.5 mg Intravenous Given 1/31/25 0937)   ondansetron (ZOFRAN) injection 4 mg (4 mg Intravenous Given 1/31/25 0936)   prochlorperazine (COMPAZINE) injection 10 mg (10 mg Intravenous Given 1/31/25 1025)   diphenhydrAMINE (BENADRYL) injection 25 mg (25 mg Intravenous Given 1/31/25 1023)   iopamidol (ISOVUE-370) 76 % injection 100 mL (100 mL Intravenous Given 1/31/25 1038)   HYDROmorphone (DILAUDID) injection 0.5 mg  (0.5 mg Intravenous Given 1/31/25 1238)       Imaging results:  CT Abdomen Pelvis With Contrast    Result Date: 1/31/2025  Impression: 1.Cholelithiasis without CT evidence of acute cholecystitis. If important to further assess cystic duct patency, nuclear medicine hepatobiliary study might be useful. 2.There are 2 nonspecific hepatic lesions as detailed above for which nonemergent follow-up hepatic MRI with and without contrast is recommended. 3.Nonobstructive right nephrolithiasis. Electronically Signed: Felice Le MD  1/31/2025 10:57 AM EST  Workstation ID: [id number]     Social issues:   Social History     Socioeconomic History    Marital status:    Tobacco Use    Smoking status: Every Day     Types: Cigarettes     Passive exposure: Current    Smokeless tobacco: Never   Vaping Use    Vaping status: Never Used   Substance and Sexual Activity    Alcohol use: Not Currently    Drug use: Never    Sexual activity: Defer       NIH Stroke Scale:  Interval: (not recorded)  1a. Level of Consciousness: (not recorded)  1b. LOC Questions: (not recorded)  1c. LOC Commands: (not recorded)  2. Best Gaze: (not recorded)  3. Visual: (not recorded)  4. Facial Palsy: (not recorded)  5a. Motor Arm, Left: (not recorded)  5b. Motor Arm, Right: (not recorded)  6a. Motor Leg, Left: (not recorded)  6b. Motor Leg, Right: (not recorded)  7. Limb Ataxia: (not recorded)  8. Sensory: (not recorded)  9. Best Language: (not recorded)  10. Dysarthria: (not recorded)  11. Extinction and Inattention (formerly Neglect): (not recorded)    Total (NIH Stroke Scale): (not recorded)     Additional notable assessment information:     Nursing report ED to floor:  OBS    Theresa Carranza RN   01/31/25 14:18 EST

## 2025-01-31 NOTE — PLAN OF CARE
Goal Outcome Evaluation:  Plan of Care Reviewed With: patient        Progress: no change  Outcome Evaluation: New admission with severe epigastric pain with reports of nausea and vomitting. Patient has not vomitted nor had nausea since arriving to the floor. Patient still complaining of epigastric pain, pain medication given to combat this. During CT AP with contrast showed several large gallstones. I was told by the RN when I was given report they will probably remove the patients gallbladder in the AM.

## 2025-01-31 NOTE — H&P
[facility] Observation Unit H&P    Patient Name: Pablo Solano  : 1970  MRN: [id number]  Primary Care Physician: Chris Bell MD  Date of admission: 2025     Patient Care Team:  Chris Bell MD as PCP - General          Subjective   History Present Illness     Chief Complaint:   Chief Complaint   Patient presents with    Abdominal Pain     C/o upper abdominal pain today, n/v     Abdominal pain     Abdominal Pain  Associated symptoms include nausea and vomiting. Pertinent negatives include no fever.     ED 2025  Patient is a 54-year-old male history of diabetes presents to the ER with complaints of severe epigastric abdominal pain that started 5 hours ago.  Patient states it started while he is sitting.  The pain radiates into his back.  He states the pain is a 9/10 constant throbbing stabbing pain.  Chest pain but states the pain takes his breath away.  No diarrhea.  No dysuria or hematuria.  No headache fever or chills.  No previous abdominal surgeries.    Observation 2025  Patient agrees with HPI noted above. He rates his pain 7/10 now. Just had dilaudid and is a little drowsy. General surgeon consulted.      Review of Systems   Constitutional: Negative for fever.   Gastrointestinal:  Positive for abdominal pain, nausea and vomiting.   All other systems reviewed and are negative.          Personal History     Past Medical History:   Past Medical History:   Diagnosis Date    Kidney stones        Surgical History:    No past surgical history on file.        Family History: family history is not on file. Otherwise pertinent FHx was reviewed and unremarkable.     Social History:  reports that he has been smoking cigarettes. He has been exposed to tobacco smoke. He has never used smokeless tobacco. He reports that he does not currently use alcohol. He reports that he does not use drugs.      Medications:  Prior to Admission medications    Medication Sig Start Date End Date  Taking? Authorizing Provider   albuterol sulfate HFA (Ventolin HFA) 108 (90 Base) MCG/ACT inhaler Inhale 2 puffs As Needed. 5/18/16   Rachel Sampson MD   Continuous Glucose Sensor (Dexcom G7 Sensor) misc Use 1 Application Every 10 (Ten) Days. E11.65 5/19/24   Chris Bell MD   EPINEPHrine (EPIPEN) 0.3 MG/0.3ML solution auto-injector injection ADMINISTER 0.3 ML IN THE MUSCLE 1 TIME FOR 1 DOSE AS DIRECTED BY PRESCRIBER 5/8/24   Rachel Sampson MD   lisinopril (PRINIVIL,ZESTRIL) 10 MG tablet Take 1 tablet by mouth Daily. 10/16/17   Rachel Sampson MD   LORazepam (ATIVAN) 1 MG tablet Take 1 tablet by mouth As Needed. 8/26/15   Rachel Sampson MD   metFORMIN (GLUCOPHAGE) 500 MG tablet Take 1 tablet by mouth 2 (Two) Times a Day With Meals. 12/20/21   Chris Bell MD   montelukast (Singulair) 10 MG tablet Take 1 tablet by mouth Every Night. 5/8/24   Shanika Riley APRN   olopatadine (Pataday) 0.1 % ophthalmic solution Apply 1 drop to eye(s) as directed by provider 2 (Two) Times a Day As Needed for Allergies. 5/8/24   Shanika Riley APRN   omeprazole (priLOSEC) 40 MG capsule Take 1 capsule by mouth Daily. 5/8/24   Shanika Riley APRN   Tirzepatide (MOUNJARO) 2.5 MG/0.5ML solution pen-injector pen Inject 0.5 mL under the skin into the appropriate area as directed 1 (One) Time Per Week. 5/30/24   Chris Bell MD       Allergies:  No Known Allergies    Objective   Objective     Vital Signs  Temp:  [97.5 °F (36.4 °C)] 97.5 °F (36.4 °C)  Heart Rate:  [72-85] 72  Resp:  [18-21] 18  BP: (119-173)/(69-97) 119/69  SpO2:  [97 %-99 %] 97 %  on   ;   Device (Oxygen Therapy): room air  Body mass index is 33.53 kg/m².    Physical Exam  Vitals and nursing note reviewed.   Constitutional:       Appearance: Normal appearance. He is obese.   HENT:      Head: Normocephalic and atraumatic.      Right Ear: External ear normal.      Left Ear: External ear normal.      Nose:  Nose normal.      Mouth/Throat:      Mouth: Mucous membranes are moist.   Eyes:      Extraocular Movements: Extraocular movements intact.   Cardiovascular:      Rate and Rhythm: Normal rate and regular rhythm.      Pulses: Normal pulses.      Heart sounds: Normal heart sounds.   Pulmonary:      Effort: Pulmonary effort is normal.      Breath sounds: Normal breath sounds.   Abdominal:      General: Abdomen is flat. Bowel sounds are normal.      Palpations: Abdomen is soft.   Musculoskeletal:         General: Normal range of motion.      Cervical back: Normal range of motion.   Skin:     General: Skin is warm.   Neurological:      Mental Status: He is alert and oriented to person, place, and time.   Psychiatric:         Behavior: Behavior normal.         Thought Content: Thought content normal.         Judgment: Judgment normal.           Results Review:  I have personally reviewed most recent cardiac tracings, lab results, and radiology images and interpretations and agree with findings, most notably: Opponent, CMP, A1c, lipase, CBC, CT abdomen and pelvis, EKG.    Results from last 7 days   Lab Units 01/31/25  0922   WBC 10*3/mm3 12.29*   HEMOGLOBIN g/dL 15.0   HEMATOCRIT % 45.2   PLATELETS 10*3/mm3 264     Results from last 7 days   Lab Units 01/31/25  1027 01/31/25  0922   SODIUM mmol/L  --  136   POTASSIUM mmol/L  --  4.4   CHLORIDE mmol/L  --  98   CO2 mmol/L  --  24.1   BUN mg/dL  --  11   CREATININE mg/dL  --  1.03   GLUCOSE mg/dL  --  204*   CALCIUM mg/dL  --  10.0   ALK PHOS U/L  --  84   ALT (SGPT) U/L  --  14   AST (SGOT) U/L  --  21   HSTROP T ng/L 10 7     Estimated Creatinine Clearance: 101.1 mL/min (by C-G formula based on SCr of 1.03 mg/dL).  Brief Urine Lab Results       None            Microbiology Results (last 10 days)       ** No results found for the last 240 hours. **            ECG/EMG Results (most recent)       Procedure Component Value Units Date/Time    ECG 12 Lead Chest Pain [[id number]]  Collected: 01/31/25 0929     Updated: 01/31/25 0930     QT Interval 375 ms      QTC Interval 432 ms     Narrative:      HEART RATE=80  bpm  RR Fwynzkgv=809  ms  ND Interval=160  ms  P Horizontal Axis=37  deg  P Front Axis=56  deg  QRSD Interval=86  ms  QT Ustwxirp=804  ms  RZmS=042  ms  QRS Axis=36  deg  T Wave Axis=60  deg  - NORMAL ECG -  Sinus rhythm  Date and Time of Study:2025-01-31 09:29:08    ECG 12 Lead [[id number]] Resulted: 01/31/25 1257     Updated: 01/31/25 1257                    CT Abdomen Pelvis With Contrast    Result Date: 1/31/2025  Impression: 1.Cholelithiasis without CT evidence of acute cholecystitis. If important to further assess cystic duct patency, nuclear medicine hepatobiliary study might be useful. 2.There are 2 nonspecific hepatic lesions as detailed above for which nonemergent follow-up hepatic MRI with and without contrast is recommended. 3.Nonobstructive right nephrolithiasis. Electronically Signed: Felice Le MD  1/31/2025 10:57 AM EST  Workstation ID: [id number]       Estimated Creatinine Clearance: 101.1 mL/min (by C-G formula based on SCr of 1.03 mg/dL).    Assessment & Plan   Assessment/Plan       Active Hospital Problems    Diagnosis  POA    **Epigastric pain [R10.13]  Yes      Resolved Hospital Problems   No resolved problems to display.       Epigastric pain/ Cholelithiasis  Lab Results   Component Value Date    WBC 12.29 (H) 01/31/2025    AST 21 01/31/2025    ALT 14 01/31/2025    ALKPHOS 84 01/31/2025    BILITOT 0.4 01/31/2025    LIPASE 19 01/31/2025   -Troponin 7, 10  -CMP and CBC generally unremarkable  -WBC 12.29  -EKG showed sinus rhythm with heart rate 80, ND interval 160 and QT C432  -CT of abdomen and pelvis: Showed cholelithiasis without evidence of cholecystitis  -In the ED patient given Benadryl Dilaudid, Zofran, Compazine  -General Surgeon consulted  -Clear liquid diet  -N.p.o at midnight.   -Possible lap tristin in am.     Diabetes mellitus  -Poorly controlled   Lab  Results   Component Value Date    GLUCOSE 204 (H) 01/31/2025    GLUCOSE 142 (H) 05/29/2024    GLUCOSE 182 (H) 12/12/2021   -A1c 7.43  -Hold metformin  -SSI  -Diabetic diet  -Monitor before meals and at bedtime         VTE Prophylaxis - Active VTE Prophylaxis  Mechanical:        Start        01/31/25 1521  Maintain Sequential Compression Device  Continuous                          Select Pharmacologic VTE Prophylaxis if Desired & Appropriate      CODE STATUS:    Code Status and Medical Interventions: CPR (Attempt to Resuscitate); Full Support   Ordered at: 01/31/25 1341     Level Of Support Discussed With:    Patient     Code Status (Patient has no pulse and is not breathing):    CPR (Attempt to Resuscitate)     Medical Interventions (Patient has pulse or is breathing):    Full Support       This patient has been examined wearing personal protective equipment.     I discussed the patient's findings and my recommendations with patient and nursing staff.      Signature:Electronically signed by MARGIE Farmer, 01/31/25, 3:23 PM EST.

## 2025-01-31 NOTE — ED PROVIDER NOTES
Subjective   History of Present Illness  Chief Complaint: Epigastric abdominal pain    Patient is a 54-year-old male history of diabetes presents to the ER with complaints of severe epigastric abdominal pain that started 5 hours ago.  Patient states it started while he is sitting.  The pain radiates into his back.  He states the pain is a 9/10 constant throbbing stabbing pain.  Chest pain but states the pain takes his breath away.  No diarrhea.  No dysuria or hematuria.  No headache fever or chills.  No previous abdominal surgeries.    PCP: Chris Bell    History provided by:  Patient      Review of Systems   Constitutional:  Negative for chills and fever.   HENT:  Negative for sore throat and trouble swallowing.    Respiratory:  Positive for shortness of breath. Negative for wheezing.    Cardiovascular:  Negative for chest pain.   Gastrointestinal:  Positive for abdominal pain, nausea and vomiting.   Genitourinary:  Negative for dysuria.   Musculoskeletal:  Positive for back pain. Negative for myalgias.   Skin:  Negative for rash.   Neurological:  Negative for headaches.   Psychiatric/Behavioral:  Negative for behavioral problems.    All other systems reviewed and are negative.      Past Medical History:   Diagnosis Date    Kidney stones        No Known Allergies    No past surgical history on file.    No family history on file.    Social History     Socioeconomic History    Marital status:    Tobacco Use    Smoking status: Every Day     Types: Cigarettes     Passive exposure: Current    Smokeless tobacco: Never   Vaping Use    Vaping status: Never Used   Substance and Sexual Activity    Alcohol use: Not Currently    Drug use: Never    Sexual activity: Defer           Objective   Physical Exam  Vitals and nursing note reviewed.   Constitutional:       General: He is not in acute distress.     Appearance: He is well-developed and normal weight. He is not diaphoretic.   Cardiovascular:      Rate and  "Rhythm: Normal rate and regular rhythm.      Heart sounds: Normal heart sounds. No murmur heard.  Pulmonary:      Effort: Pulmonary effort is normal.      Breath sounds: Normal breath sounds.   Abdominal:      General: Bowel sounds are normal.      Palpations: Abdomen is soft.      Tenderness: There is abdominal tenderness in the epigastric area. There is guarding. There is no right CVA tenderness or left CVA tenderness.   Skin:     General: Skin is warm.      Capillary Refill: Capillary refill takes less than 2 seconds.   Neurological:      General: No focal deficit present.      Mental Status: He is alert and oriented to person, place, and time.   Psychiatric:         Mood and Affect: Mood normal.         Behavior: Behavior normal.         ECG 12 Lead      Date/Time: 1/31/2025 12:47 PM    Performed by: Ambar Hamilton PA  Authorized by: Ilia Ibarra MD  Interpreted by ED physician  Previous ECG: no previous ECG available  Rhythm: sinus rhythm  Rate: normal  BPM: 80  QRS axis: normal  Conduction: conduction normal  Other: no other findings  Clinical impression: non-specific ECG               ED Course  ED Course as of 01/31/25 1254   Fri Jan 31, 2025   1014 Pain not improved after Dilaudid []   1105 Pain slightly improved after Compazine and Benadryl.  Patient notified of CT findings. []   1106 Surgery paged []   1246 I spoke with [facility] who agreed to accept patient for observation admission for pain control, surgery consultation and further monitoring for []      ED Course User Index  [MC] Ambar Hamilton PA    /97   Pulse 85   Temp 97.5 °F (36.4 °C) (Oral)   Resp 20   Ht 177.8 cm (70\")   Wt 106 kg (233 lb 11 oz)   SpO2 97%   BMI 33.53 kg/m²   Labs Reviewed   COMPREHENSIVE METABOLIC PANEL - Abnormal; Notable for the following components:       Result Value    Glucose 204 (*)     All other components within normal limits    Narrative:     GFR Categories in Chronic Kidney Disease " (CKD)      GFR Category          GFR (mL/min/1.73)    Interpretation  G1                     90 or greater         Normal or high (1)  G2                      60-89                Mild decrease (1)  G3a                   45-59                Mild to moderate decrease  G3b                   30-44                Moderate to severe decrease  G4                    15-29                Severe decrease  G5                    14 or less           Kidney failure          (1)In the absence of evidence of kidney disease, neither GFR category G1 or G2 fulfill the criteria for CKD.    eGFR calculation 2021 CKD-EPI creatinine equation, which does not include race as a factor   CBC WITH AUTO DIFFERENTIAL - Abnormal; Notable for the following components:    WBC 12.29 (*)     Neutrophil % 80.3 (*)     Lymphocyte % 11.1 (*)     Neutrophils, Absolute 9.86 (*)     All other components within normal limits   HIGH SENSITIVITIY TROPONIN T 1HR - Abnormal; Notable for the following components:    Troponin T Numeric Delta 3 (*)     All other components within normal limits    Narrative:     High Sensitive Troponin T Reference Range:  <14.0 ng/L- Negative Female for AMI  <22.0 ng/L- Negative Male for AMI  >=14 - Abnormal Female indicating possible myocardial injury.  >=22 - Abnormal Male indicating possible myocardial injury.   Clinicians would have to utilize clinical acumen, EKG, Troponin, and serial changes to determine if it is an Acute Myocardial Infarction or myocardial injury due to an underlying chronic condition.        LIPASE - Normal   TROPONIN - Normal    Narrative:     High Sensitive Troponin T Reference Range:  <14.0 ng/L- Negative Female for AMI  <22.0 ng/L- Negative Male for AMI  >=14 - Abnormal Female indicating possible myocardial injury.  >=22 - Abnormal Male indicating possible myocardial injury.   Clinicians would have to utilize clinical acumen, EKG, Troponin, and serial changes to determine if it is an Acute Myocardial  Infarction or myocardial injury due to an underlying chronic condition.        RAINBOW DRAW    Narrative:     The following orders were created for panel order [place] Draw.  Procedure                               Abnormality         Status                     ---------                               -----------         ------                     Green Top (Gel)[[id number]]                                  Final result               Lavender Top[[id number]]                                     Final result               Gold Top - SST[[id number]]                                   Final result               Light Blue Top[[id number]]                                   Final result                 Please view results for these tests on the individual orders.   URINALYSIS W/ CULTURE IF INDICATED   HEMOGLOBIN A1C   GREEN TOP   LAVENDER TOP   GOLD TOP - SST   LIGHT BLUE TOP   CBC AND DIFFERENTIAL    Narrative:     The following orders were created for panel order CBC & Differential.  Procedure                               Abnormality         Status                     ---------                               -----------         ------                     CBC Auto Differential[[id number]]        Abnormal            Final result                 Please view results for these tests on the individual orders.     Medications   sodium chloride 0.9 % flush 10 mL (has no administration in time range)   sodium chloride 0.9 % flush 10 mL (has no administration in time range)   HYDROmorphone (DILAUDID) injection 0.5 mg (0.5 mg Intravenous Given 1/31/25 0937)   ondansetron (ZOFRAN) injection 4 mg (4 mg Intravenous Given 1/31/25 0936)   prochlorperazine (COMPAZINE) injection 10 mg (10 mg Intravenous Given 1/31/25 1025)   diphenhydrAMINE (BENADRYL) injection 25 mg (25 mg Intravenous Given 1/31/25 1023)   iopamidol (ISOVUE-370) 76 % injection 100 mL (100 mL Intravenous Given 1/31/25 1038)   HYDROmorphone (DILAUDID) injection 0.5 mg (0.5 mg  Intravenous Given 1/31/25 1238)     CT Abdomen Pelvis With Contrast    Result Date: 1/31/2025  Impression: 1.Cholelithiasis without CT evidence of acute cholecystitis. If important to further assess cystic duct patency, nuclear medicine hepatobiliary study might be useful. 2.There are 2 nonspecific hepatic lesions as detailed above for which nonemergent follow-up hepatic MRI with and without contrast is recommended. 3.Nonobstructive right nephrolithiasis. Electronically Signed: Felice Le MD  1/31/2025 10:57 AM EST  Workstation ID: [id number]                                                      Medical Decision Making  Differential Dx (Includes but not limited to): Cholecystitis choledocholithiasis pancreatitis NSTEMI    Chart Review: No pertinent records to review    While in the ED IV was placed and labs were obtained appropriate PPE was worn during exam and throughout all encounters with the patient.  Patient had the above evaluation.  He is afebrile nontoxic appearance in no acute respiratory distress.  He complains of severe epigastric Johnson pain rating to the back.  No chest pain.  EKG obtained showing sinus rhythm rate of 80 with no acute ST changes.  This was reviewed by myself interpreted by ER attending Dr. Ibarra.  Patient was given IV pain medication and nausea medication with some improvement.  Lab work reviewed by myself shows mild leukocytosis 12,000, CMP glucose 204 otherwise unremarkable, normal LFTs.  Normal lipase.  Troponin 7.  CT imaging reviewed by myself and ER attending Dr. Ibarra shows cholelithiasis without evidence of cholecystitis or pain or otitis.  However patient does have multiple very large cholelithiasis.  Consult placed to general surgery, Dr. Cade was in surgery but I was advised to admit the patient for pain control and for surgical consultation and he would see the patient in the hospital.  Patient agreeable to admission for pain control and surgery consultation.  Spoke  with [facility] Tor[facility] who agreed to accept patient to the ED observation unit.    Case discussed with ER attending Dr. Ibarra.    Problems Addressed:  Calculus of gallbladder without cholecystitis without obstruction: acute illness or injury  Epigastric abdominal pain: acute illness or injury  Nausea: acute illness or injury    Amount and/or Complexity of Data Reviewed  Labs: ordered. Decision-making details documented in ED Course.  Radiology: ordered. Decision-making details documented in ED Course.  ECG/medicine tests: ordered. Decision-making details documented in ED Course.    Risk  Prescription drug management.  Decision regarding hospitalization.        Final diagnoses:   Calculus of gallbladder without cholecystitis without obstruction   Epigastric abdominal pain   Nausea       ED Disposition  ED Disposition       ED Disposition   Decision to Admit    Condition   --    Comment   --               No follow-up provider specified.       Medication List      No changes were made to your prescriptions during this visit.            Ambar Hamilton PA  01/31/25 1257

## 2025-02-01 ENCOUNTER — ANESTHESIA (OUTPATIENT)
Dept: PERIOP | Facility: HOSPITAL | Age: 55
End: 2025-02-01

## 2025-02-01 ENCOUNTER — READMISSION MANAGEMENT (OUTPATIENT)
Dept: CALL CENTER | Facility: HOSPITAL | Age: 55
End: 2025-02-01

## 2025-02-01 ENCOUNTER — ANESTHESIA EVENT (OUTPATIENT)
Dept: PERIOP | Facility: HOSPITAL | Age: 55
End: 2025-02-01

## 2025-02-01 ENCOUNTER — DOCUMENTATION (OUTPATIENT)
Facility: HOSPITAL | Age: 55
End: 2025-02-01

## 2025-02-01 VITALS
BODY MASS INDEX: 33.04 KG/M2 | SYSTOLIC BLOOD PRESSURE: 128 MMHG | DIASTOLIC BLOOD PRESSURE: 86 MMHG | RESPIRATION RATE: 20 BRPM | TEMPERATURE: 97.5 F | OXYGEN SATURATION: 94 % | HEART RATE: 88 BPM | WEIGHT: 230.8 LBS | HEIGHT: 70 IN

## 2025-02-01 LAB
ANION GAP SERPL CALCULATED.3IONS-SCNC: 13.1 MMOL/L (ref 5–15)
BASOPHILS # BLD AUTO: 0.07 10*3/MM3 (ref 0–0.2)
BASOPHILS NFR BLD AUTO: 0.7 % (ref 0–1.5)
BUN SERPL-MCNC: 8 MG/DL (ref 6–20)
BUN/CREAT SERPL: 8.9 (ref 7–25)
CALCIUM SPEC-SCNC: 9.5 MG/DL (ref 8.6–10.5)
CHLORIDE SERPL-SCNC: 98 MMOL/L (ref 98–107)
CO2 SERPL-SCNC: 23.9 MMOL/L (ref 22–29)
CREAT SERPL-MCNC: 0.9 MG/DL (ref 0.76–1.27)
DEPRECATED RDW RBC AUTO: 40.7 FL (ref 37–54)
EGFRCR SERPLBLD CKD-EPI 2021: 102.1 ML/MIN/1.73
EOSINOPHIL # BLD AUTO: 0.08 10*3/MM3 (ref 0–0.4)
EOSINOPHIL NFR BLD AUTO: 0.8 % (ref 0.3–6.2)
ERYTHROCYTE [DISTWIDTH] IN BLOOD BY AUTOMATED COUNT: 13.2 % (ref 12.3–15.4)
GLUCOSE BLDC GLUCOMTR-MCNC: 125 MG/DL (ref 70–105)
GLUCOSE BLDC GLUCOMTR-MCNC: 145 MG/DL (ref 70–105)
GLUCOSE SERPL-MCNC: 114 MG/DL (ref 65–99)
HCT VFR BLD AUTO: 45 % (ref 37.5–51)
HGB BLD-MCNC: 14.8 G/DL (ref 13–17.7)
IMM GRANULOCYTES # BLD AUTO: 0.04 10*3/MM3 (ref 0–0.05)
IMM GRANULOCYTES NFR BLD AUTO: 0.4 % (ref 0–0.5)
LYMPHOCYTES # BLD AUTO: 1.31 10*3/MM3 (ref 0.7–3.1)
LYMPHOCYTES NFR BLD AUTO: 12.6 % (ref 19.6–45.3)
MCH RBC QN AUTO: 27.7 PG (ref 26.6–33)
MCHC RBC AUTO-ENTMCNC: 32.9 G/DL (ref 31.5–35.7)
MCV RBC AUTO: 84.3 FL (ref 79–97)
MONOCYTES # BLD AUTO: 1.07 10*3/MM3 (ref 0.1–0.9)
MONOCYTES NFR BLD AUTO: 10.3 % (ref 5–12)
NEUTROPHILS NFR BLD AUTO: 7.84 10*3/MM3 (ref 1.7–7)
NEUTROPHILS NFR BLD AUTO: 75.2 % (ref 42.7–76)
NRBC BLD AUTO-RTO: 0 /100 WBC (ref 0–0.2)
PLATELET # BLD AUTO: 244 10*3/MM3 (ref 140–450)
PMV BLD AUTO: 9.9 FL (ref 6–12)
POTASSIUM SERPL-SCNC: 3.9 MMOL/L (ref 3.5–5.2)
QT INTERVAL: 375 MS
QTC INTERVAL: 432 MS
RBC # BLD AUTO: 5.34 10*6/MM3 (ref 4.14–5.8)
SODIUM SERPL-SCNC: 135 MMOL/L (ref 136–145)
WBC NRBC COR # BLD AUTO: 10.41 10*3/MM3 (ref 3.4–10.8)

## 2025-02-01 PROCEDURE — 82948 REAGENT STRIP/BLOOD GLUCOSE: CPT | Performed by: NURSE PRACTITIONER

## 2025-02-01 PROCEDURE — 25010000002 CEFAZOLIN PER 500 MG: Performed by: STUDENT IN AN ORGANIZED HEALTH CARE EDUCATION/TRAINING PROGRAM

## 2025-02-01 PROCEDURE — 96375 TX/PRO/DX INJ NEW DRUG ADDON: CPT

## 2025-02-01 PROCEDURE — 25010000002 PROPOFOL 10 MG/ML EMULSION: Performed by: ANESTHESIOLOGY

## 2025-02-01 PROCEDURE — 25010000002 SUCCINYLCHOLINE PER 20 MG: Performed by: ANESTHESIOLOGY

## 2025-02-01 PROCEDURE — 80048 BASIC METABOLIC PNL TOTAL CA: CPT | Performed by: NURSE PRACTITIONER

## 2025-02-01 PROCEDURE — 25010000002 ONDANSETRON PER 1 MG: Performed by: ANESTHESIOLOGY

## 2025-02-01 PROCEDURE — 25010000002 KETOROLAC TROMETHAMINE PER 15 MG: Performed by: NURSE PRACTITIONER

## 2025-02-01 PROCEDURE — 96376 TX/PRO/DX INJ SAME DRUG ADON: CPT

## 2025-02-01 PROCEDURE — 25010000002 MIDAZOLAM PER 1 MG: Performed by: ANESTHESIOLOGY

## 2025-02-01 PROCEDURE — 25010000002 INDOCYANINE GREEN 25 MG RECONSTITUTED SOLUTION: Performed by: STUDENT IN AN ORGANIZED HEALTH CARE EDUCATION/TRAINING PROGRAM

## 2025-02-01 PROCEDURE — 25010000002 HYDROMORPHONE 1 MG/ML SOLUTION: Performed by: ANESTHESIOLOGY

## 2025-02-01 PROCEDURE — 47562 LAPAROSCOPIC CHOLECYSTECTOMY: CPT | Performed by: STUDENT IN AN ORGANIZED HEALTH CARE EDUCATION/TRAINING PROGRAM

## 2025-02-01 PROCEDURE — 88304 TISSUE EXAM BY PATHOLOGIST: CPT | Performed by: STUDENT IN AN ORGANIZED HEALTH CARE EDUCATION/TRAINING PROGRAM

## 2025-02-01 PROCEDURE — 47562 LAPAROSCOPIC CHOLECYSTECTOMY: CPT

## 2025-02-01 PROCEDURE — S2900 ROBOTIC SURGICAL SYSTEM: HCPCS | Performed by: STUDENT IN AN ORGANIZED HEALTH CARE EDUCATION/TRAINING PROGRAM

## 2025-02-01 PROCEDURE — 25010000002 HYDROMORPHONE PER 4 MG: Performed by: NURSE PRACTITIONER

## 2025-02-01 PROCEDURE — G0378 HOSPITAL OBSERVATION PER HR: HCPCS

## 2025-02-01 PROCEDURE — 85025 COMPLETE CBC W/AUTO DIFF WBC: CPT | Performed by: NURSE PRACTITIONER

## 2025-02-01 PROCEDURE — 82948 REAGENT STRIP/BLOOD GLUCOSE: CPT

## 2025-02-01 PROCEDURE — 25010000002 METRONIDAZOLE 500 MG/100ML SOLUTION: Performed by: STUDENT IN AN ORGANIZED HEALTH CARE EDUCATION/TRAINING PROGRAM

## 2025-02-01 PROCEDURE — 25010000002 DEXAMETHASONE PER 1 MG: Performed by: ANESTHESIOLOGY

## 2025-02-01 PROCEDURE — 25010000002 SUGAMMADEX 200 MG/2ML SOLUTION: Performed by: ANESTHESIOLOGY

## 2025-02-01 PROCEDURE — 25810000003 LACTATED RINGERS PER 1000 ML: Performed by: ANESTHESIOLOGY

## 2025-02-01 PROCEDURE — 25010000002 CEFAZOLIN PER 500 MG: Performed by: ANESTHESIOLOGY

## 2025-02-01 PROCEDURE — 25010000002 BUPIVACAINE (PF) 0.25 % SOLUTION: Performed by: STUDENT IN AN ORGANIZED HEALTH CARE EDUCATION/TRAINING PROGRAM

## 2025-02-01 PROCEDURE — 25010000002 FENTANYL CITRATE (PF) 100 MCG/2ML SOLUTION: Performed by: ANESTHESIOLOGY

## 2025-02-01 DEVICE — MEDIUM-LARGE LIGATION CLIPS 6 CLIPS/CART
Type: IMPLANTABLE DEVICE | Site: ABDOMEN | Status: FUNCTIONAL
Brand: VAS-Q-CLIP

## 2025-02-01 RX ORDER — SODIUM CHLORIDE, SODIUM LACTATE, POTASSIUM CHLORIDE, CALCIUM CHLORIDE 600; 310; 30; 20 MG/100ML; MG/100ML; MG/100ML; MG/100ML
INJECTION, SOLUTION INTRAVENOUS CONTINUOUS PRN
Status: DISCONTINUED | OUTPATIENT
Start: 2025-02-01 | End: 2025-02-01 | Stop reason: SURG

## 2025-02-01 RX ORDER — ONDANSETRON 2 MG/ML
4 INJECTION INTRAMUSCULAR; INTRAVENOUS ONCE AS NEEDED
Status: DISCONTINUED | OUTPATIENT
Start: 2025-02-01 | End: 2025-02-01 | Stop reason: HOSPADM

## 2025-02-01 RX ORDER — BUPIVACAINE HYDROCHLORIDE 2.5 MG/ML
INJECTION, SOLUTION EPIDURAL; INFILTRATION; INTRACAUDAL AS NEEDED
Status: DISCONTINUED | OUTPATIENT
Start: 2025-02-01 | End: 2025-02-01 | Stop reason: HOSPADM

## 2025-02-01 RX ORDER — FENTANYL CITRATE 50 UG/ML
50 INJECTION, SOLUTION INTRAMUSCULAR; INTRAVENOUS
Status: DISCONTINUED | OUTPATIENT
Start: 2025-02-01 | End: 2025-02-01 | Stop reason: HOSPADM

## 2025-02-01 RX ORDER — DEXAMETHASONE SODIUM PHOSPHATE 4 MG/ML
INJECTION, SOLUTION INTRA-ARTICULAR; INTRALESIONAL; INTRAMUSCULAR; INTRAVENOUS; SOFT TISSUE AS NEEDED
Status: DISCONTINUED | OUTPATIENT
Start: 2025-02-01 | End: 2025-02-01 | Stop reason: SURG

## 2025-02-01 RX ORDER — OXYCODONE HYDROCHLORIDE 5 MG/1
10 TABLET ORAL EVERY 4 HOURS PRN
Status: DISCONTINUED | OUTPATIENT
Start: 2025-02-01 | End: 2025-02-01 | Stop reason: HOSPADM

## 2025-02-01 RX ORDER — HYDROCODONE BITARTRATE AND ACETAMINOPHEN 5; 325 MG/1; MG/1
1 TABLET ORAL EVERY 4 HOURS PRN
Status: DISCONTINUED | OUTPATIENT
Start: 2025-02-01 | End: 2025-02-01 | Stop reason: HOSPADM

## 2025-02-01 RX ORDER — POLYETHYLENE GLYCOL 3350 17 G/17G
17 POWDER, FOR SOLUTION ORAL DAILY
Status: DISCONTINUED | OUTPATIENT
Start: 2025-02-01 | End: 2025-02-01 | Stop reason: HOSPADM

## 2025-02-01 RX ORDER — CEFAZOLIN SODIUM 1 G/3ML
INJECTION, POWDER, FOR SOLUTION INTRAMUSCULAR; INTRAVENOUS AS NEEDED
Status: DISCONTINUED | OUTPATIENT
Start: 2025-02-01 | End: 2025-02-01 | Stop reason: SURG

## 2025-02-01 RX ORDER — FENTANYL CITRATE 50 UG/ML
INJECTION, SOLUTION INTRAMUSCULAR; INTRAVENOUS AS NEEDED
Status: DISCONTINUED | OUTPATIENT
Start: 2025-02-01 | End: 2025-02-01 | Stop reason: SURG

## 2025-02-01 RX ORDER — MIDAZOLAM HYDROCHLORIDE 1 MG/ML
INJECTION, SOLUTION INTRAMUSCULAR; INTRAVENOUS AS NEEDED
Status: DISCONTINUED | OUTPATIENT
Start: 2025-02-01 | End: 2025-02-01 | Stop reason: SURG

## 2025-02-01 RX ORDER — MAGNESIUM HYDROXIDE 1200 MG/15ML
LIQUID ORAL AS NEEDED
Status: DISCONTINUED | OUTPATIENT
Start: 2025-02-01 | End: 2025-02-01 | Stop reason: HOSPADM

## 2025-02-01 RX ORDER — ROCURONIUM BROMIDE 10 MG/ML
INJECTION, SOLUTION INTRAVENOUS AS NEEDED
Status: DISCONTINUED | OUTPATIENT
Start: 2025-02-01 | End: 2025-02-01 | Stop reason: SURG

## 2025-02-01 RX ORDER — PHENYLEPHRINE HCL IN 0.9% NACL 1 MG/10 ML
SYRINGE (ML) INTRAVENOUS AS NEEDED
Status: DISCONTINUED | OUTPATIENT
Start: 2025-02-01 | End: 2025-02-01 | Stop reason: SURG

## 2025-02-01 RX ORDER — PROPOFOL 10 MG/ML
VIAL (ML) INTRAVENOUS AS NEEDED
Status: DISCONTINUED | OUTPATIENT
Start: 2025-02-01 | End: 2025-02-01 | Stop reason: SURG

## 2025-02-01 RX ORDER — SUCCINYLCHOLINE CHLORIDE 20 MG/ML
INJECTION INTRAMUSCULAR; INTRAVENOUS AS NEEDED
Status: DISCONTINUED | OUTPATIENT
Start: 2025-02-01 | End: 2025-02-01 | Stop reason: SURG

## 2025-02-01 RX ORDER — ONDANSETRON 2 MG/ML
INJECTION INTRAMUSCULAR; INTRAVENOUS AS NEEDED
Status: DISCONTINUED | OUTPATIENT
Start: 2025-02-01 | End: 2025-02-01 | Stop reason: SURG

## 2025-02-01 RX ADMIN — SUCCINYLCHOLINE CHLORIDE 160 MG: 20 INJECTION, SOLUTION INTRAMUSCULAR; INTRAVENOUS at 15:31

## 2025-02-01 RX ADMIN — ROCURONIUM BROMIDE 20 MG: 10 INJECTION INTRAVENOUS at 16:32

## 2025-02-01 RX ADMIN — KETOROLAC TROMETHAMINE 15 MG: 30 INJECTION, SOLUTION INTRAMUSCULAR at 09:34

## 2025-02-01 RX ADMIN — MIDAZOLAM 2 MG: 1 INJECTION INTRAMUSCULAR; INTRAVENOUS at 15:26

## 2025-02-01 RX ADMIN — CEFAZOLIN 2 G: 1 INJECTION, POWDER, FOR SOLUTION INTRAMUSCULAR; INTRAVENOUS at 15:43

## 2025-02-01 RX ADMIN — Medication 200 MCG: at 15:52

## 2025-02-01 RX ADMIN — SODIUM CHLORIDE, SODIUM LACTATE, POTASSIUM CHLORIDE, AND CALCIUM CHLORIDE: .6; .31; .03; .02 INJECTION, SOLUTION INTRAVENOUS at 16:05

## 2025-02-01 RX ADMIN — FENTANYL CITRATE 100 MCG: 50 INJECTION, SOLUTION INTRAMUSCULAR; INTRAVENOUS at 15:26

## 2025-02-01 RX ADMIN — HYDROCODONE BITARTRATE AND ACETAMINOPHEN 1 TABLET: 5; 325 TABLET ORAL at 19:37

## 2025-02-01 RX ADMIN — METRONIDAZOLE 500 MG: 500 INJECTION, SOLUTION INTRAVENOUS at 04:07

## 2025-02-01 RX ADMIN — ROCURONIUM BROMIDE 20 MG: 10 INJECTION INTRAVENOUS at 17:17

## 2025-02-01 RX ADMIN — HYDROMORPHONE HYDROCHLORIDE 1 MG: 1 INJECTION, SOLUTION INTRAMUSCULAR; INTRAVENOUS; SUBCUTANEOUS at 16:05

## 2025-02-01 RX ADMIN — Medication 10 ML: at 08:07

## 2025-02-01 RX ADMIN — PROPOFOL 200 MG: 10 INJECTION, EMULSION INTRAVENOUS at 15:31

## 2025-02-01 RX ADMIN — Medication 200 MCG: at 15:48

## 2025-02-01 RX ADMIN — Medication 200 MCG: at 15:54

## 2025-02-01 RX ADMIN — ROCURONIUM BROMIDE 10 MG: 10 INJECTION INTRAVENOUS at 15:31

## 2025-02-01 RX ADMIN — ONDANSETRON 4 MG: 2 INJECTION INTRAMUSCULAR; INTRAVENOUS at 17:33

## 2025-02-01 RX ADMIN — METRONIDAZOLE 500 MG: 500 INJECTION, SOLUTION INTRAVENOUS at 11:28

## 2025-02-01 RX ADMIN — CEFAZOLIN 2000 MG: 2 INJECTION, POWDER, FOR SOLUTION INTRAMUSCULAR; INTRAVENOUS at 10:25

## 2025-02-01 RX ADMIN — SODIUM CHLORIDE 500 ML: 9 INJECTION, SOLUTION INTRAVENOUS at 15:23

## 2025-02-01 RX ADMIN — INDOCYANINE GREEN AND WATER 2.5 MG: KIT at 07:15

## 2025-02-01 RX ADMIN — DEXAMETHASONE SODIUM PHOSPHATE 4 MG: 4 INJECTION, SOLUTION INTRAMUSCULAR; INTRAVENOUS at 16:04

## 2025-02-01 RX ADMIN — SUGAMMADEX 200 MG: 100 INJECTION, SOLUTION INTRAVENOUS at 17:42

## 2025-02-01 RX ADMIN — ROCURONIUM BROMIDE 40 MG: 10 INJECTION INTRAVENOUS at 15:37

## 2025-02-01 RX ADMIN — HYDROMORPHONE HYDROCHLORIDE 0.5 MG: 1 INJECTION, SOLUTION INTRAMUSCULAR; INTRAVENOUS; SUBCUTANEOUS at 08:07

## 2025-02-01 RX ADMIN — HYDROMORPHONE HYDROCHLORIDE 0.5 MG: 1 INJECTION, SOLUTION INTRAMUSCULAR; INTRAVENOUS; SUBCUTANEOUS at 10:37

## 2025-02-01 RX ADMIN — HYDROMORPHONE HYDROCHLORIDE 0.5 MG: 1 INJECTION, SOLUTION INTRAMUSCULAR; INTRAVENOUS; SUBCUTANEOUS at 04:14

## 2025-02-01 NOTE — DISCHARGE SUMMARY
[facility]    Chris Bell MD    CHIEF COMPLAINT:     Epigastric pain     HISTORY OF PRESENT ILLNESS:    Abdominal Pain        ED 01/31/2025  Patient is a 54-year-old male history of diabetes presents to the ER with complaints of severe epigastric abdominal pain that started 5 hours ago.  Patient states it started while he is sitting.  The pain radiates into his back.  He states the pain is a 9/10 constant throbbing stabbing pain.  Chest pain but states the pain takes his breath away.  No diarrhea.  No dysuria or hematuria.  No headache fever or chills.  No previous abdominal surgeries.     Observation 01/31/2025  Patient agrees with HPI noted above. He rates his pain 7/10 now. Just had dilaudid and is a little drowsy. General surgeon consulted.      Observation 2/01/2025  Patient still c/o epigastric pain. Plans to have lap tristin this morning. No signs of simple sepsis. WBC was elevated yesterday and wnr today. Vital signs within normal range, afebrile. Started on iv antibiotic yesterday for surgery prophylaxis. Hopefully dc later this afternoon.     Chart reviewed by provider. Per nursing staff, patient signed himself AMA around 2020    Past Medical History:   Diagnosis Date    Kidney stones      History reviewed. No pertinent surgical history.  History reviewed. No pertinent family history.  Social History     Tobacco Use    Smoking status: Every Day     Types: Cigarettes     Passive exposure: Current    Smokeless tobacco: Never   Vaping Use    Vaping status: Never Used   Substance Use Topics    Alcohol use: Not Currently    Drug use: Never     Medications Prior to Admission   Medication Sig Dispense Refill Last Dose/Taking    albuterol sulfate HFA (Ventolin HFA) 108 (90 Base) MCG/ACT inhaler Inhale 2 puffs As Needed.   Unknown    Continuous Glucose Sensor (Dexcom G7 Sensor) misc Use 1 Application Every 10 (Ten) Days. E11.65 3 each 5     EPINEPHrine (EPIPEN) 0.3 MG/0.3ML  solution auto-injector injection ADMINISTER 0.3 ML IN THE MUSCLE 1 TIME FOR 1 DOSE AS DIRECTED BY PRESCRIBER   Unknown    lisinopril (PRINIVIL,ZESTRIL) 10 MG tablet Take 1 tablet by mouth Daily.   More than a month    LORazepam (ATIVAN) 1 MG tablet Take 1 tablet by mouth As Needed.   More than a month    metFORMIN (GLUCOPHAGE) 500 MG tablet Take 1 tablet by mouth 2 (Two) Times a Day With Meals. 60 tablet 3 More than a month    montelukast (Singulair) 10 MG tablet Take 1 tablet by mouth Every Night. 90 tablet 1 More than a month    olopatadine (Pataday) 0.1 % ophthalmic solution Apply 1 drop to eye(s) as directed by provider 2 (Two) Times a Day As Needed for Allergies. 5 mL 12 More than a month    omeprazole (priLOSEC) 40 MG capsule Take 1 capsule by mouth Daily. 90 capsule 1 More than a month    Tirzepatide (MOUNJARO) 2.5 MG/0.5ML solution pen-injector pen Inject 0.5 mL under the skin into the appropriate area as directed 1 (One) Time Per Week. 2 mL 0 More than a month     Allergies:  Patient has no known allergies.    Immunization History   Administered Date(s) Administered    COVID-19 (PFIZER) Purple Cap Monovalent 03/20/2021, 04/10/2021    Tdap 05/29/2024           REVIEW OF SYSTEMS:    Review of Systems   Unable to perform ROS: Other   Patient LEFT AMA    Vital Signs  Temp:  [98.1 °F (36.7 °C)-99.3 °F (37.4 °C)] 98.3 °F (36.8 °C)  Heart Rate:  [72-82] 74  Resp:  [14-24] 21  BP: (112-125)/(67-81) 112/67          Physical Exam:  Physical Exam  Nursing note reviewed.     Unable to assess patient after surgery. Left AMA    Emotional Behavior:   NA   Debilities:  NA  Results Review:    I reviewed the patient's new clinical results.  Lab Results (most recent)       Procedure Component Value Units Date/Time    POC Glucose 4x Daily Before Meals & at Bedtime [[id number]]  (Abnormal) Collected: 02/01/25 0755    Specimen: Blood Updated: 02/01/25 0757     Glucose 145 mg/dL      Comment: Serial Number: [id number]:   [id number]       Basic Metabolic Panel [[id number]]  (Abnormal) Collected: 02/01/25 0034    Specimen: Blood Updated: 02/01/25 0209     Glucose 114 mg/dL      BUN 8 mg/dL      Creatinine 0.90 mg/dL      Sodium 135 mmol/L      Potassium 3.9 mmol/L      Chloride 98 mmol/L      CO2 23.9 mmol/L      Calcium 9.5 mg/dL      BUN/Creatinine Ratio 8.9     Anion Gap 13.1 mmol/L      eGFR 102.1 mL/min/1.73     Narrative:      GFR Categories in Chronic Kidney Disease (CKD)      GFR Category          GFR (mL/min/1.73)    Interpretation  G1                     90 or greater         Normal or high (1)  G2                      60-89                Mild decrease (1)  G3a                   45-59                Mild to moderate decrease  G3b                   30-44                Moderate to severe decrease  G4                    15-29                Severe decrease  G5                    14 or less           Kidney failure          (1)In the absence of evidence of kidney disease, neither GFR category G1 or G2 fulfill the criteria for CKD.    eGFR calculation 2021 CKD-EPI creatinine equation, which does not include race as a factor    CBC & Differential [[id number]]  (Abnormal) Collected: 02/01/25 0034    Specimen: Blood Updated: 02/01/25 0145    Narrative:      The following orders were created for panel order CBC & Differential.  Procedure                               Abnormality         Status                     ---------                               -----------         ------                     CBC Auto Differential[[id number]]        Abnormal            Final result                 Please view results for these tests on the individual orders.    CBC Auto Differential [[id number]]  (Abnormal) Collected: 02/01/25 0034    Specimen: Blood Updated: 02/01/25 0145     WBC 10.41 10*3/mm3      RBC 5.34 10*6/mm3      Hemoglobin 14.8 g/dL      Hematocrit 45.0 %      MCV 84.3 fL      MCH 27.7 pg      MCHC 32.9 g/dL      RDW 13.2 %      RDW-SD  40.7 fl      MPV 9.9 fL      Platelets 244 10*3/mm3      Neutrophil % 75.2 %      Lymphocyte % 12.6 %      Monocyte % 10.3 %      Eosinophil % 0.8 %      Basophil % 0.7 %      Immature Grans % 0.4 %      Neutrophils, Absolute 7.84 10*3/mm3      Lymphocytes, Absolute 1.31 10*3/mm3      Monocytes, Absolute 1.07 10*3/mm3      Eosinophils, Absolute 0.08 10*3/mm3      Basophils, Absolute 0.07 10*3/mm3      Immature Grans, Absolute 0.04 10*3/mm3      nRBC 0.0 /100 WBC     Urinalysis With Culture If Indicated - Urine, Clean Catch [[id number]]  (Abnormal) Collected: 01/31/25 2047    Specimen: Urine, Clean Catch Updated: 01/31/25 2131     Color, UA Yellow     Appearance, UA Clear     pH, UA 7.0     Specific Gravity, UA 1.034     Glucose, UA Negative     Ketones, UA Negative     Bilirubin, UA Negative     Blood, UA Negative     Protein, UA Negative     Leuk Esterase, UA Negative     Nitrite, UA Negative     Urobilinogen, UA 1.0 E.U./dL    Narrative:      In absence of clinical symptoms, the presence of pyuria, bacteria, and/or nitrites on the urinalysis result does not correlate with infection.  Urine microscopic not indicated.    POC Glucose Once [[id number]]  (Abnormal) Collected: 01/31/25 2006    Specimen: Blood Updated: 01/31/25 2008     Glucose 146 mg/dL      Comment: Serial Number: [id number]:  [id number]       Hemoglobin A1c [[id number]]  (Abnormal) Collected: 01/31/25 0922    Specimen: Blood from Arm, Right Updated: 01/31/25 1406     Hemoglobin A1C 7.43 %     High Sensitivity Troponin T 1Hr [[id number]]  (Abnormal) Collected: 01/31/25 1027    Specimen: Blood from Arm, Right Updated: 01/31/25 1109     HS Troponin T 10 ng/L      Troponin T Numeric Delta 3 ng/L     Narrative:      High Sensitive Troponin T Reference Range:  <14.0 ng/L- Negative Female for AMI  <22.0 ng/L- Negative Male for AMI  >=14 - Abnormal Female indicating possible myocardial injury.  >=22 - Abnormal Male indicating possible myocardial injury.    Clinicians would have to utilize clinical acumen, EKG, Troponin, and serial changes to determine if it is an Acute Myocardial Infarction or myocardial injury due to an underlying chronic condition.         CBC & Differential [[id number]]  (Abnormal) Collected: 01/31/25 0922    Specimen: Blood from Arm, Right Updated: 01/31/25 1016    Narrative:      The following orders were created for panel order CBC & Differential.  Procedure                               Abnormality         Status                     ---------                               -----------         ------                     CBC Auto Differential[[id number]]        Abnormal            Final result                 Please view results for these tests on the individual orders.    CBC Auto Differential [[id number]]  (Abnormal) Collected: 01/31/25 0922    Specimen: Blood from Arm, Right Updated: 01/31/25 1016     WBC 12.29 10*3/mm3      RBC 5.36 10*6/mm3      Hemoglobin 15.0 g/dL      Hematocrit 45.2 %      MCV 84.3 fL      MCH 28.0 pg      MCHC 33.2 g/dL      RDW 13.2 %      RDW-SD 40.9 fl      MPV 9.6 fL      Platelets 264 10*3/mm3      Neutrophil % 80.3 %      Lymphocyte % 11.1 %      Monocyte % 6.6 %      Eosinophil % 1.0 %      Basophil % 0.8 %      Immature Grans % 0.2 %      Neutrophils, Absolute 9.86 10*3/mm3      Lymphocytes, Absolute 1.37 10*3/mm3      Monocytes, Absolute 0.81 10*3/mm3      Eosinophils, Absolute 0.12 10*3/mm3      Basophils, Absolute 0.10 10*3/mm3      Immature Grans, Absolute 0.03 10*3/mm3      nRBC 0.0 /100 WBC     Comprehensive Metabolic Panel [[id number]]  (Abnormal) Collected: 01/31/25 0922    Specimen: Blood from Arm, Right Updated: 01/31/25 1013     Glucose 204 mg/dL      BUN 11 mg/dL      Creatinine 1.03 mg/dL      Sodium 136 mmol/L      Potassium 4.4 mmol/L      Chloride 98 mmol/L      CO2 24.1 mmol/L      Calcium 10.0 mg/dL      Total Protein 7.9 g/dL      Albumin 4.5 g/dL      ALT (SGPT) 14 U/L      AST (SGOT) 21 U/L       Alkaline Phosphatase 84 U/L      Total Bilirubin 0.4 mg/dL      Globulin 3.4 gm/dL      A/G Ratio 1.3 g/dL      BUN/Creatinine Ratio 10.7     Anion Gap 13.9 mmol/L      eGFR 86.9 mL/min/1.73     Narrative:      GFR Categories in Chronic Kidney Disease (CKD)      GFR Category          GFR (mL/min/1.73)    Interpretation  G1                     90 or greater         Normal or high (1)  G2                      60-89                Mild decrease (1)  G3a                   45-59                Mild to moderate decrease  G3b                   30-44                Moderate to severe decrease  G4                    15-29                Severe decrease  G5                    14 or less           Kidney failure          (1)In the absence of evidence of kidney disease, neither GFR category G1 or G2 fulfill the criteria for CKD.    eGFR calculation 2021 CKD-EPI creatinine equation, which does not include race as a factor    Lipase [[id number]]  (Normal) Collected: 01/31/25 0922    Specimen: Blood from Arm, Right Updated: 01/31/25 1013     Lipase 19 U/L     High Sensitivity Troponin T [[id number]]  (Normal) Collected: 01/31/25 0922    Specimen: Blood from Arm, Right Updated: 01/31/25 1013     HS Troponin T 7 ng/L     Narrative:      High Sensitive Troponin T Reference Range:  <14.0 ng/L- Negative Female for AMI  <22.0 ng/L- Negative Male for AMI  >=14 - Abnormal Female indicating possible myocardial injury.  >=22 - Abnormal Male indicating possible myocardial injury.   Clinicians would have to utilize clinical acumen, EKG, Troponin, and serial changes to determine if it is an Acute Myocardial Infarction or myocardial injury due to an underlying chronic condition.         [place] Draw [[id number]] Collected: 01/31/25 0922    Specimen: Blood from Arm, Right Updated: 01/31/25 0945    Narrative:      The following orders were created for panel order [place] Draw.  Procedure                               Abnormality         Status                      ---------                               -----------         ------                     Green Top (Gel)[[id number]]                                  Final result               Lavender Top[[id number]]                                     Final result               Gold Top - SST[[id number]]                                   Final result               Light Blue Top[[id number]]                                   Final result                 Please view results for these tests on the individual orders.    Green Top (Gel) [[id number]] Collected: 01/31/25 0922    Specimen: Blood from Arm, Right Updated: 01/31/25 0945     Extra Tube Hold for add-ons.     Comment: Auto resulted.       Lavender Top [[id number]] Collected: 01/31/25 0922    Specimen: Blood from Arm, Right Updated: 01/31/25 0945     Extra Tube hold for add-on     Comment: Auto resulted       Gold Top - SST [[id number]] Collected: 01/31/25 0922    Specimen: Blood from Arm, Right Updated: 01/31/25 0945     Extra Tube Hold for add-ons.     Comment: Auto resulted.       Light Blue Top [[id number]] Collected: 01/31/25 0922    Specimen: Blood from Arm, Right Updated: 01/31/25 0945     Extra Tube Hold for add-ons.     Comment: Auto resulted               Imaging Results (Most Recent)       Procedure Component Value Units Date/Time    CT Abdomen Pelvis With Contrast [[id number]] Collected: 01/31/25 1039     Updated: 01/31/25 1059    Narrative:      CT ABDOMEN PELVIS W CONTRAST    Date of Exam: 1/31/2025 10:38 AM EST    Indication: epigastric pain, back pain.    Comparison: None available.    Technique: Axial CT images were obtained of the abdomen and pelvis following the uneventful intravenous administration of iodinated contrast. Sagittal and coronal reconstructions were performed.  Automated exposure control and iterative reconstruction   methods were used.        Findings:  LUNG BASES:  Unremarkable without mass or infiltrate.    LIVER: There 2 hypodense  peripherally enhancing lesions within segment VII. These are 4.9 cm mass (image 23, series 5) and 2.6 cm nodule (image 35, series 5). While these may represent benign hemangiomata, neoplasm in particular metastatic disease not   excluded. Nonemergent follow-up hepatic MRI with and without contrast recommended. Liver is otherwise unremarkable.  BILIARY/GALLBLADDER: There are calcified gallstones including a 1.9 cm stone at the level of the gallbladder neck. Gallbladder is nondistended. There are no gallbladder inflammatory changes to suggest acute cholecystitis based on CT imaging. Correlate   with symptoms. If important to further assess cystic duct patency, nuclear medicine hepatobiliary study might be useful.  SPLEEN:  Unremarkable  PANCREAS:  Unremarkable  ADRENAL:  Unremarkable  KIDNEYS:  Unremarkable parenchyma with no solid mass identified. No obstruction.  There is a 4 mm nonobstructive calculus within the upper pole of the right kidney.  GASTROINTESTINAL/MESENTERY:  No evidence of obstruction nor inflammation.  The appendix is normal. No mass identified.  MESENTERIC VESSELS:  Patent.  AORTA/IVC:  Normal caliber.    RETROPERITONEUM/LYMPH NODES:  Unremarkable    REPRODUCTIVE:  Unremarkable  BLADDER:  Unremarkable    OSSEUS STRUCTURES:  Typical for age with no acute process identified.        Impression:      Impression:  1.Cholelithiasis without CT evidence of acute cholecystitis. If important to further assess cystic duct patency, nuclear medicine hepatobiliary study might be useful.  2.There are 2 nonspecific hepatic lesions as detailed above for which nonemergent follow-up hepatic MRI with and without contrast is recommended.  3.Nonobstructive right nephrolithiasis.            Electronically Signed: Felice Le MD    1/31/2025 10:57 AM EST    Workstation ID: [id number]          reviewed    ECG/EMG Results (most recent)       Procedure Component Value Units Date/Time    ECG 12 Lead [[id number]] Resulted:  01/31/25 1257     Updated: 01/31/25 1257    Telemetry Scan [[id number]] Resulted: 01/31/25     Updated: 01/31/25 1638    ECG 12 Lead Chest Pain [[id number]] Collected: 01/31/25 0929     Updated: 02/01/25 1014     QT Interval 375 ms      QTC Interval 432 ms     Narrative:      HEART RATE=80  bpm  RR Rrgeapfx=859  ms  KS Interval=160  ms  P Horizontal Axis=37  deg  P Front Axis=56  deg  QRSD Interval=86  ms  QT Bhfvmknr=421  ms  ZZkU=791  ms  QRS Axis=36  deg  T Wave Axis=60  deg  - NORMAL ECG -  Sinus rhythm  No previous ECG available for comparison  Electronically Signed By: Ilia Ibarra (Torito) 2025-02-01 10:14:18  Date and Time of Study:2025-01-31 09:29:08          reviewed            Microbiology Results (last 10 days)       ** No results found for the last 240 hours. **            Assessment & Plan     Epigastric pain          Epigastric pain/ Cholelithiasis  Lab Results   Component Value Date    WBC 10.41 02/01/2025    AST 21 01/31/2025    ALT 14 01/31/2025    ALKPHOS 84 01/31/2025    BILITOT 0.4 01/31/2025    LIPASE 19 01/31/2025   -Troponin 7, 10  -CMP and CBC generally unremarkable  -WBC 12.29 yesterday and 10.41 today  -EKG showed sinus rhythm with heart rate 80, KS interval 160 and QT C432  -CT of abdomen and pelvis: Showed cholelithiasis without evidence of cholecystitis  -In the ED patient given Benadryl Dilaudid, Zofran, Compazine  -General Surgeon consulted  -Clear liquid diet  -N.p.o at midnight.   -02/01 Underwent lap tristin and general surgeon recommended to monitor overnight and possibly dc in am. Patient left AMA    Diabetes mellitus  -Poorly controlled   Lab Results   Component Value Date    GLUCOSE 114 (H) 02/01/2025    GLUCOSE 204 (H) 01/31/2025    GLUCOSE 142 (H) 05/29/2024    GLUCOSE 182 (H) 12/12/2021   -A1c 7.43  -Hold metformin  -SSI  -Diabetic diet  -Monitor before meals and at bedtime     I discussed the patients findings and my recommendations with patient and nursing staff.     Discharge  Diagnosis:      Epigastric pain      Hospital Course  Patient is a 54 y.o. male presented with epigastric pain as noted on HPI above.  Troponin, CMP, CBC, EKG generally unremarkable.  WBC mildly elevated 12.29 and down to 10.41.  CT abdomen pelvis showed cholelithiasis without cholecystitis.  Patient was admitted to the observation unit and general surgeon was consulted.  He underwent laparoscopic cholecystectomy on 02/01 and surgeon recommended to monitor overnight and likely dc on 02/02. However, patient left AMA on 02/01 after returned to the floor from OR. RN discussed risks with patient.     Past Medical History:     Past Medical History:   Diagnosis Date    Kidney stones        Past Surgical History:   History reviewed. No pertinent surgical history.    Social History:   Social History     Socioeconomic History    Marital status:    Tobacco Use    Smoking status: Every Day     Types: Cigarettes     Passive exposure: Current    Smokeless tobacco: Never   Vaping Use    Vaping status: Never Used   Substance and Sexual Activity    Alcohol use: Not Currently    Drug use: Never    Sexual activity: Defer       Procedures Performed    Procedure(s):  CHOLECYSTECTOMY LAPAROSCOPIC WITH DAVINCI ROBOT  -------------------       Consults:   Consults       Date and Time Order Name Status Description    1/31/2025 11:05 AM Surgery (on-call MD unless specified) Completed             Condition on Discharge:     Stable    Discharge Disposition      Discharge Medications     Discharge Medications        Continue These Medications        Instructions Start Date   Dexcom G7 Sensor misc   1 Application, Not Applicable, Every 10 Days, E11.65             ASK your doctor about these medications        Instructions Start Date   EPINEPHrine 0.3 MG/0.3ML solution auto-injector injection  Commonly known as: EPIPEN   ADMINISTER 0.3 ML IN THE MUSCLE 1 TIME FOR 1 DOSE AS DIRECTED BY PRESCRIBER      lisinopril 10 MG tablet  Commonly  known as: PRINIVIL,ZESTRIL   10 mg, Oral, Daily      LORazepam 1 MG tablet  Commonly known as: ATIVAN   1 mg, Oral, As Needed      metFORMIN 500 MG tablet  Commonly known as: GLUCOPHAGE   500 mg, Oral, 2 Times Daily With Meals      montelukast 10 MG tablet  Commonly known as: Singulair   10 mg, Oral, Nightly      olopatadine 0.1 % ophthalmic solution  Commonly known as: Pataday   1 drop, Ophthalmic, 2 Times Daily PRN      omeprazole 40 MG capsule  Commonly known as: priLOSEC   40 mg, Oral, Daily      Tirzepatide 2.5 MG/0.5ML solution auto-injector   2.5 mg, Subcutaneous, Weekly      Ventolin  (90 Base) MCG/ACT inhaler  Generic drug: albuterol sulfate HFA   2 puffs, Inhalation, As Needed               Discharge Diet:     Activity at Discharge:     Follow-up Appointments  No future appointments.      Test Results Pending at Discharge  Pending Results       None             Risk for Readmission (LACE) Score: 1 (2/1/2025  6:00 AM)      Greater than 30 minutes spent in discharge activities for this patient    Signature:Electronically signed by MARGIE Farmer, 02/01/25, 10:20 AM EST.

## 2025-02-01 NOTE — ANESTHESIA PROCEDURE NOTES
Airway  Urgency: elective    Date/Time: 2/1/2025 3:33 PM  Airway not difficult    General Information and Staff    Patient location during procedure: OR    Indications and Patient Condition  Indications for airway management: airway protection    Preoxygenated: yes  Mask difficulty assessment: 0 - not attempted    Final Airway Details  Final airway type: endotracheal airway      Successful airway: ETT  Cuffed: yes   Successful intubation technique: video laryngoscopy  Facilitating devices/methods: intubating stylet  Endotracheal tube insertion site: oral  Blade: Flood  Blade size: 3  ETT size (mm): 7.5  Cormack-Lehane Classification: grade I - full view of glottis  Placement verified by: capnometry   Cuff volume (mL): 10  Measured from: lips  ETT/EBT  to lips (cm): 21  Number of attempts at approach: 1  Assessment: lips, teeth, and gum same as pre-op and atraumatic intubation

## 2025-02-01 NOTE — PLAN OF CARE
Problem: Adult Inpatient Plan of Care  Goal: Plan of Care Review  Outcome: Progressing  Flowsheets (Taken 2/1/2025 1104)  Plan of Care Reviewed With: patient     Problem: Pain Acute  Goal: Optimal Pain Control and Function  Outcome: Progressing  Intervention: Optimize Psychosocial Wellbeing  Recent Flowsheet Documentation  Taken 2/1/2025 0807 by Ella Oates RN  Supportive Measures:   active listening utilized   verbalization of feelings encouraged  Diversional Activities: television  Intervention: Develop Pain Management Plan  Recent Flowsheet Documentation  Taken 2/1/2025 0807 by Ella Oates RN  Pain Management Interventions: pain medication given  Intervention: Prevent or Manage Pain  Recent Flowsheet Documentation  Taken 2/1/2025 0807 by Ella Oates RN  Sensory Stimulation Regulation: care clustered  Sleep/Rest Enhancement: consistent schedule promoted  Medication Review/Management: medications reviewed     Problem: Adult Inpatient Plan of Care  Goal: Absence of Hospital-Acquired Illness or Injury  Intervention: Identify and Manage Fall Risk  Recent Flowsheet Documentation  Taken 2/1/2025 0807 by Ella Oates RN  Safety Promotion/Fall Prevention:   activity supervised   assistive device/personal items within reach   clutter free environment maintained   fall prevention program maintained   lighting adjusted   nonskid shoes/slippers when out of bed   room organization consistent   safety round/check completed  Intervention: Prevent Skin Injury  Recent Flowsheet Documentation  Taken 2/1/2025 0807 by Ella Oates RN  Body Position: position changed independently  Skin Protection: transparent dressing maintained  Intervention: Prevent and Manage VTE (Venous Thromboembolism) Risk  Recent Flowsheet Documentation  Taken 2/1/2025 0807 by Ella Oates RN  VTE Prevention/Management:   bilateral   SCDs (sequential compression devices) off  Intervention: Prevent Infection  Recent Flowsheet Documentation  Taken  2/1/2025 0807 by Ella Oates RN  Infection Prevention:   hand hygiene promoted   rest/sleep promoted   single patient room provided  Goal: Optimal Comfort and Wellbeing  Intervention: Monitor Pain and Promote Comfort  Recent Flowsheet Documentation  Taken 2/1/2025 0807 by Ella Oates RN  Pain Management Interventions: pain medication given  Intervention: Provide Person-Centered Care  Recent Flowsheet Documentation  Taken 2/1/2025 0807 by Ella Oates RN  Trust Relationship/Rapport:   care explained   choices provided   emotional support provided   empathic listening provided   Goal Outcome Evaluation:  Plan of Care Reviewed With: patient   Pt at OR for [facility]. Discharge order to be discontinued. Per Dr. Cade, pt needs to stay overnight Will inform incoming night shift nurse.

## 2025-02-01 NOTE — CONSULTS
General Surgery Consult Note      Name: Pablo Solano ADMIT: 2025   : 1970  PCP: Chris Bell MD    MRN: [id number] LOS: 0 days   AGE/SEX: 54 y.o. male  ROOM: [address] [place] [address]      Patient Care Team:  Chris Bell MD as PCP - General  Chief Complaint   Patient presents with    Abdominal Pain     C/o upper abdominal pain today, n/v       Subjective   54-year-old gentleman with history of kidney stones, no significant past medical or surgical history who is admitted for epigastric abdominal pain and vomiting.  LFTs normal.  White blood cell count elevated.  No similar symptoms in the past.  CT abdomen pelvis with gallstones with what appears to be gallstone stuck in the neck of his gallbladder.    Past Medical History:   Diagnosis Date    Kidney stones      History reviewed. No pertinent surgical history.  History reviewed. No pertinent family history.    Social History     Tobacco Use    Smoking status: Every Day     Types: Cigarettes     Passive exposure: Current    Smokeless tobacco: Never   Vaping Use    Vaping status: Never Used   Substance Use Topics    Alcohol use: Not Currently    Drug use: Never     Medications Prior to Admission   Medication Sig Dispense Refill Last Dose/Taking    albuterol sulfate HFA (Ventolin HFA) 108 (90 Base) MCG/ACT inhaler Inhale 2 puffs As Needed.   Unknown    Continuous Glucose Sensor (Dexcom G7 Sensor) misc Use 1 Application Every 10 (Ten) Days. E11.65 3 each 5     EPINEPHrine (EPIPEN) 0.3 MG/0.3ML solution auto-injector injection ADMINISTER 0.3 ML IN THE MUSCLE 1 TIME FOR 1 DOSE AS DIRECTED BY PRESCRIBER   Unknown    lisinopril (PRINIVIL,ZESTRIL) 10 MG tablet Take 1 tablet by mouth Daily.   More than a month    LORazepam (ATIVAN) 1 MG tablet Take 1 tablet by mouth As Needed.   More than a month    metFORMIN (GLUCOPHAGE) 500 MG tablet Take 1 tablet by mouth 2 (Two) Times a Day With Meals. 60 tablet 3 More than a month    montelukast  (Singulair) 10 MG tablet Take 1 tablet by mouth Every Night. 90 tablet 1 More than a month    olopatadine (Pataday) 0.1 % ophthalmic solution Apply 1 drop to eye(s) as directed by provider 2 (Two) Times a Day As Needed for Allergies. 5 mL 12 More than a month    omeprazole (priLOSEC) 40 MG capsule Take 1 capsule by mouth Daily. 90 capsule 1 More than a month    Tirzepatide (MOUNJARO) 2.5 MG/0.5ML solution pen-injector pen Inject 0.5 mL under the skin into the appropriate area as directed 1 (One) Time Per Week. 2 mL 0 More than a month     [START ON 2/1/2025] ceFAZolin, 2,000 mg, Intravenous, On Call to OR  cefTRIAXone, 2,000 mg, Intravenous, Q24H  [START ON 2/1/2025] indocyanine green, 2.5 mg, Intravenous, Once  insulin lispro, 2-9 Units, Subcutaneous, 4x Daily AC & at Bedtime  metroNIDAZOLE, 500 mg, Intravenous, Q8H  sodium chloride, 10 mL, Intravenous, Q12H           acetaminophen **OR** acetaminophen **OR** acetaminophen    aluminum-magnesium hydroxide-simethicone    senna-docusate sodium **AND** polyethylene glycol **AND** bisacodyl **AND** bisacodyl    dextrose    dextrose    glucagon (human recombinant)    HYDROmorphone    ketorolac    nitroglycerin    ondansetron ODT **OR** ondansetron    sodium chloride    [COMPLETED] Insert Peripheral IV **AND** sodium chloride    sodium chloride    sodium chloride  Patient has no known allergies.    Review of Systems   Constitutional:  Negative for chills and fever.   HENT:  Negative for rhinorrhea and trouble swallowing.    Eyes:  Negative for blurred vision and double vision.   Respiratory:  Negative for cough and shortness of breath.    Cardiovascular:  Negative for chest pain and palpitations.   Gastrointestinal:  Negative for abdominal distention and abdominal pain.   Musculoskeletal:  Negative for back pain and myalgias.   Skin:  Negative for color change and dry skin.   Neurological:  Negative for headache and confusion.   Psychiatric/Behavioral:  Negative for  "agitation and hallucinations.         Objective     Vital Signs and Labs:  Vital Signs Patient Vitals for the past 24 hrs:   BP Temp Temp src Pulse Resp SpO2 Height Weight   01/31/25 1522 125/81 98.1 °F (36.7 °C) Oral 82 17 93 % 177.8 cm (70\") 105 kg (230 lb 12.8 oz)   01/31/25 1410 119/69 -- -- 72 18 97 % -- --   01/31/25 0955 139/97 -- -- 85 20 97 % -- --   01/31/25 0914 -- 97.5 °F (36.4 °C) Oral -- -- -- -- --   01/31/25 0848 173/83 -- -- 84 21 99 % 177.8 cm (70\") 106 kg (233 lb 11 oz)     I/O:  No intake/output data recorded.    Physical Exam:  Physical Exam  Constitutional:       General: He is not in acute distress.     Appearance: Normal appearance. He is not ill-appearing.   HENT:      Head: Normocephalic and atraumatic.      Right Ear: External ear normal.      Left Ear: External ear normal.   Eyes:      Extraocular Movements: Extraocular movements intact.      Conjunctiva/sclera: Conjunctivae normal.   Cardiovascular:      Rate and Rhythm: Normal rate and regular rhythm.   Pulmonary:      Effort: Pulmonary effort is normal. No respiratory distress.   Abdominal:      General: There is no distension.      Palpations: Abdomen is soft.      Tenderness: There is no abdominal tenderness.   Musculoskeletal:         General: No swelling or deformity.   Skin:     General: Skin is warm and dry.   Neurological:      Mental Status: He is alert and oriented to person, place, and time. Mental status is at baseline.         CBC    Results from last 7 days   Lab Units 01/31/25  0922   WBC 10*3/mm3 12.29*   HEMOGLOBIN g/dL 15.0   PLATELETS 10*3/mm3 264     BMP   Results from last 7 days   Lab Units 01/31/25  0922   SODIUM mmol/L 136   POTASSIUM mmol/L 4.4   CHLORIDE mmol/L 98   CO2 mmol/L 24.1   BUN mg/dL 11   CREATININE mg/dL 1.03   GLUCOSE mg/dL 204*     Radiology(recent) CT Abdomen Pelvis With Contrast    Result Date: 1/31/2025  Impression: 1.Cholelithiasis without CT evidence of acute cholecystitis. If important to " further assess cystic duct patency, nuclear medicine hepatobiliary study might be useful. 2.There are 2 nonspecific hepatic lesions as detailed above for which nonemergent follow-up hepatic MRI with and without contrast is recommended. 3.Nonobstructive right nephrolithiasis. Electronically Signed: Felice Le MD  1/31/2025 10:57 AM EST  Workstation ID: [id number]     I reviewed the patient's new clinical results.    Assessment & Plan       Epigastric pain      54-year-old gentleman with history of kidney stones, no significant past medical or surgical history who is admitted for epigastric abdominal pain and vomiting.  LFTs normal.  White blood cell count elevated.  No similar symptoms in the past.  CT abdomen pelvis with gallstones with what appears to be gallstone stuck in the neck of his gallbladder.  Plan for cholecystectomy tomorrow.   I discussed risk, benefits and alternatives to robotic cholecystectomy with patient including bowel injury, infection, bleeding, hepatic artery injury and bile duct injury requiring the need for further interventions and patient elected to proceed.       This note was created using Dragon Voice Recognition software.    Stephen Cade MD  01/31/25  19:04 EST

## 2025-02-01 NOTE — ANESTHESIA PREPROCEDURE EVALUATION
Anesthesia Evaluation     Patient summary reviewed and Nursing notes reviewed   NPO Solid Status: > 8 hours  NPO Liquid Status: > 8 hours           Airway   Mallampati: II  TM distance: >3 FB  Neck ROM: full  No difficulty expected  Dental    (+) edentulous    Pulmonary    (+) a smoker,  Cardiovascular         Neuro/Psych  GI/Hepatic/Renal/Endo    (+) obesity, renal disease- stones    Musculoskeletal     Abdominal    Substance History      OB/GYN          Other                    Anesthesia Plan    ASA 2     general     intravenous induction     Anesthetic plan, risks, benefits, and alternatives have been provided, discussed and informed consent has been obtained with: patient.    Plan discussed with CRNA.    CODE STATUS:    Level Of Support Discussed With: Patient  Code Status (Patient has no pulse and is not breathing): CPR (Attempt to Resuscitate)  Medical Interventions (Patient has pulse or is breathing): Full Support

## 2025-02-01 NOTE — ANESTHESIA POSTPROCEDURE EVALUATION
Patient: Pablo Solano    Procedure Summary       Date: 02/01/25 Room / Location: [facility] OR  / [facility] MAIN OR    Anesthesia Start: 1526 Anesthesia Stop: 1756    Procedure: CHOLECYSTECTOMY LAPAROSCOPIC WITH DAVINCI ROBOT (Abdomen) Diagnosis:     Surgeons: Stephen Cade MD Provider: John Ribeiro MD    Anesthesia Type: general ASA Status: 2            Anesthesia Type: general    Vitals  Vitals Value Taken Time   /81 02/01/25 1821   Temp 98.1 °F (36.7 °C) 02/01/25 1753   Pulse 83 02/01/25 1822   Resp 23 02/01/25 1808   SpO2 92 % 02/01/25 1822   Vitals shown include unfiled device data.        Post Anesthesia Care and Evaluation    Patient location during evaluation: PACU  Patient participation: complete - patient participated  Level of consciousness: awake  Pain scale: See nurse's notes for pain score.  Pain management: adequate    Airway patency: patent  Anesthetic complications: No anesthetic complications  PONV Status: none  Cardiovascular status: acceptable  Respiratory status: acceptable and spontaneous ventilation  Hydration status: acceptable    Comments: Patient seen and examined postoperatively; vital signs stable; SpO2 greater than or equal to 90%; cardiopulmonary status stable; nausea/vomiting adequately controlled; pain adequately controlled; no apparent anesthesia complications; patient discharged from anesthesia care when discharge criteria were met

## 2025-02-01 NOTE — PLAN OF CARE
Goal Outcome Evaluation:  Plan of Care Reviewed With: patient        Progress: no change  Outcome Evaluation: Pt rested during shift, pain med given for intermittent pain. Awaitingrobtic Lap Louise today. Will cont to monitor.

## 2025-02-01 NOTE — OP NOTE
Operative Report:    Patient Name:  Pablo Solano  YOB: 1970    Date of Surgery:  2/1/2025     Indications:    54-year-old gentleman with history of kidney stones, no significant past medical or surgical history who is admitted for epigastric abdominal pain and vomiting.  LFTs normal.  White blood cell count elevated.  No similar symptoms in the past.  CT abdomen pelvis with gallstones with what appears to be gallstone stuck in the neck of his gallbladder.  Plan for cholecystectomy tomorrow.   I discussed risk, benefits and alternatives to robotic cholecystectomy with patient including bowel injury, infection, bleeding, hepatic artery injury and bile duct injury requiring the need for further interventions and patient elected to proceed.     Pre-op Diagnosis:   cholecystitis       Post-Op Diagnosis Codes:  same    Procedure/CPT® Codes:  NH LAPAROSCOPY SURG CHOLECYSTECTOMY [95706]    Procedure(s):  CHOLECYSTECTOMY LAPAROSCOPIC WITH DAVINCI ROBOT    Staff:  Surgeon(s):  Stephen Cade MD    Circulator: Sapphire Cleaning RN; Maycol Hi RN  Physician Assistant: Sylvia Walter PA  was responsible for performing the following activities: Retraction, Suction, Irrigation, Suturing, Closing, Placing Dressing, and Held/Positioned Camera and their skilled assistance was necessary for the success of this case.  Scrub Person: Ambika Wright          Anesthesia: General    Estimated Blood Loss:.  300 cc    Implants:    Implant Name Type Inv. Item Serial No.  Lot No. LRB No. Used Action   CARTRDG CLIP LIGAT VASQCLIP [id number] MD/GENARO RÍOS - [id number] Implant CARTRDG CLIP LIGAT VASQCLIP [id number] MD/GENARO RÍOS  [employer] [id number] N/A 1 Implanted       Specimen:          Specimens       ID Source Type Tests Collected By Collected At Frozen?    A Gallbladder Tissue TISSUE PATHOLOGY EXAM   Stephen Cade MD 2/1/25 0319     This specimen was not marked as sent.                 Findings: Severe acute on chronic cholecystitis    Complications: None    Description of Procedure:   After risk benefits and alternatives were discussed with patient informed sent was obtained.  Patient was transported the operating room placed supine operating table underwent general anesthesia.  The abdomen is prepped and draped in sterile fashion surgical timeout completed.  I inserted a 5 mm trocar in the left upper quadrant under laparoscopic visualization.  Insufflate the peritoneal cavity inspected the area below my entry site with no injury identified.  Patient was placed in reverse Trendelenburg position.  I inserted an 8 mm trocar in the right lower quadrant 8 mm trocar through the right rectus muscle 12 mm trocar through the left rectus muscle and upsized my entry trocar to an 8 mm robotic trocar.  The robot was brought in and docked and I proceeded to the robotic console.    I turned attention the right upper quadrant.  Patient had a severely inflamed gallbladder.  I grasped the dome of the gallbladder retracted this toward the right upper quadrant.  Patient had omentum densely here to the anterior surface of the gallbladder using cautery and blunt dissection this was dissected off until about FDC down the gallbladder.  At that point it was difficult to see if the duodenum to the infundibulum or very inflamed omentum.  I made a window in the omentum and entered the lesser sac at that point I was able to follow the backside of the omentum I could tell there was no duodenum stuck to the infundibulum.  I divided the omentum with robotic scissors.  At that point I could identify the infundibulum and retract this laterally.  Blunt dissection was used to dissect the omentum off of the infundibulum.  I was able to open the peritoneum along the anterior and posterior surface of the infundibulum of the inferior one third of the gallbladder.  I cleared the triangle between the cystic duct and artery of all  fat and fibrous tissue in the triangle above the cystic artery and the inferior one third of the gallbladder of all fat and fibrous tissue until there were only 2 structures remaining entering directly to the gallbladder.  Once a critical view of safety was obtained I placed 3 clips on the cystic duct and 2 clips on the cystic artery.  I divided the cystic duct between the clips electrocautery on cut mode and the cystic artery above the clips electrocautery.  I used electrocautery to dissect the gallbladder off the gallbladder fossa.  There was diffuse oozing throughout the case because of the difficult dissection with about 300 cc blood loss total throughout the case.  I check for hemostasis after the gallbladder was removed from the liver and there is no bleeding identified.  I used a suction  to irrigate out the right upper quadrant and suctioned this clean.  I placed the gallbladder in an Endo Catch bag removed this from the 12 mm port site.  The fascia at the 12 mm port site was closed with a 0 Vicryl suture using laparoscopic suture passer.  The abdomen was desufflated the remaining trocars were removed.  Skin was reapproximated with 4-0 Vicryl suture and covered surgical glue.  Patient was woken from general anesthesia and transferred recovery unit without incident.       Stephen Cade MD     Date: 2/1/2025  Time: 17:43 EST    This note was created using Dragon Voice Recognition software.

## 2025-02-02 NOTE — SIGNIFICANT NOTE
Notified via secure chat by Dr Cade recommendations to keep patient overnight.RN aware. Agree with surgeon.

## 2025-02-02 NOTE — PROGRESS NOTES
[facility] Observation Unit Progress Note    Patient Name: Pablo Solano  : 1970  MRN: [id number]  Primary Care Physician: Chris Bell MD  Date of admission: 2025     Patient Care Team:  Chris Bell MD as PCP - General          Subjective   History Present Illness     Chief Complaint:   Chief Complaint   Patient presents with    Abdominal Pain     C/o upper abdominal pain today, n/v     Abdominal pain     Abdominal Pain      ED 2025  Patient is a 54-year-old male history of diabetes presents to the ER with complaints of severe epigastric abdominal pain that started 5 hours ago.  Patient states it started while he is sitting.  The pain radiates into his back.  He states the pain is a 9/10 constant throbbing stabbing pain.  Chest pain but states the pain takes his breath away.  No diarrhea.  No dysuria or hematuria.  No headache fever or chills.  No previous abdominal surgeries.     Observation 2025  Patient agrees with HPI noted above. He rates his pain 7/10 now. Just had dilaudid and is a little drowsy. General surgeon consulted.      Observation 2025  Patient still c/o epigastric pain. Plans to have lap tristin this afternoon. No signs of simple sepsis. WBC was elevated yesterday and wnr today. Vital signs within normal range, afebrile. Started on iv antibiotic yesterday for surgery prophylaxis. Hopefully dc tonight if ok with surgeon     Review of Systems   Gastrointestinal:  Positive for abdominal pain.     REVIEW OF SYSTEMS:    Review of Systems   Gastrointestinal:  Positive for abdominal pain.      Constitutional: Negative for fever.   Gastrointestinal:  Positive for abdominal pain, nausea and vomiting.   All other systems reviewed and are negative.      Personal History     Past Medical History:   Past Medical History:   Diagnosis Date    Kidney stones        Surgical History:    History reviewed. No pertinent surgical history.        Family History: family  history is not on file. Otherwise pertinent FHx was reviewed and unremarkable.     Social History:  reports that he has been smoking cigarettes. He has been exposed to tobacco smoke. He has never used smokeless tobacco. He reports that he does not currently use alcohol. He reports that he does not use drugs.      Medications:  Prior to Admission medications    Medication Sig Start Date End Date Taking? Authorizing Provider   albuterol sulfate HFA (Ventolin HFA) 108 (90 Base) MCG/ACT inhaler Inhale 2 puffs As Needed. 5/18/16   Rachel Sampson MD   Continuous Glucose Sensor (Dexcom G7 Sensor) misc Use 1 Application Every 10 (Ten) Days. E11.65 5/19/24   Chris Bell MD   EPINEPHrine (EPIPEN) 0.3 MG/0.3ML solution auto-injector injection ADMINISTER 0.3 ML IN THE MUSCLE 1 TIME FOR 1 DOSE AS DIRECTED BY PRESCRIBER 5/8/24   Rachel Sampson MD   lisinopril (PRINIVIL,ZESTRIL) 10 MG tablet Take 1 tablet by mouth Daily. 10/16/17   Rachel Sampson MD   LORazepam (ATIVAN) 1 MG tablet Take 1 tablet by mouth As Needed. 8/26/15   Rachel Sampson MD   metFORMIN (GLUCOPHAGE) 500 MG tablet Take 1 tablet by mouth 2 (Two) Times a Day With Meals. 12/20/21   Chris Bell MD   montelukast (Singulair) 10 MG tablet Take 1 tablet by mouth Every Night. 5/8/24   Shanika Riley APRN   olopatadine (Pataday) 0.1 % ophthalmic solution Apply 1 drop to eye(s) as directed by provider 2 (Two) Times a Day As Needed for Allergies. 5/8/24   Shanika Riley APRN   omeprazole (priLOSEC) 40 MG capsule Take 1 capsule by mouth Daily. 5/8/24   Shanika Riley APRN   Tirzepatide (MOUNJARO) 2.5 MG/0.5ML solution pen-injector pen Inject 0.5 mL under the skin into the appropriate area as directed 1 (One) Time Per Week. 5/30/24   Chris Bell MD       Allergies:  No Known Allergies    Objective   Objective     Vital Signs  Temp:  [97.5 °F (36.4 °C)-99 °F (37.2 °C)] 97.5 °F (36.4 °C)  Heart Rate:   [] 88  Resp:  [12-24] 20  BP: ()/(38-86) 128/86  SpO2:  [90 %-100 %] 94 %  on  Flow (L/min) (Oxygen Therapy):  [2-6] 2;   Device (Oxygen Therapy): room air  Body mass index is 33.12 kg/m².    Physical Exam  Vitals and nursing note reviewed.   Constitutional:       Appearance: Normal appearance.   HENT:      Head: Normocephalic and atraumatic.      Right Ear: External ear normal.      Left Ear: External ear normal.      Nose: Nose normal.      Mouth/Throat:      Mouth: Mucous membranes are moist.   Eyes:      Extraocular Movements: Extraocular movements intact.   Cardiovascular:      Rate and Rhythm: Normal rate and regular rhythm.      Pulses: Normal pulses.      Heart sounds: Normal heart sounds.   Pulmonary:      Effort: Pulmonary effort is normal.      Breath sounds: Normal breath sounds.   Abdominal:      General: Abdomen is flat. Bowel sounds are normal.      Palpations: Abdomen is soft.      Tenderness: There is abdominal tenderness.   Musculoskeletal:         General: Normal range of motion.      Cervical back: Normal range of motion.   Skin:     General: Skin is warm.   Neurological:      Mental Status: He is alert and oriented to person, place, and time.   Psychiatric:         Behavior: Behavior normal.         Thought Content: Thought content normal.         Judgment: Judgment normal.           Results Review:  I have personally reviewed most recent lab results and radiology images and interpretations and agree with findings, most notably:  CMP, A1c, lipase, CBC, CT abdomen and pelvis, EKG. . .    Results from last 7 days   Lab Units 02/01/25  0034   WBC 10*3/mm3 10.41   HEMOGLOBIN g/dL 14.8   HEMATOCRIT % 45.0   PLATELETS 10*3/mm3 244     Results from last 7 days   Lab Units 02/01/25  0034 01/31/25  1027 01/31/25  0922   SODIUM mmol/L 135*  --  136   POTASSIUM mmol/L 3.9  --  4.4   CHLORIDE mmol/L 98  --  98   CO2 mmol/L 23.9  --  24.1   BUN mg/dL 8  --  11   CREATININE mg/dL 0.90  --  1.03    GLUCOSE mg/dL 114*  --  204*   CALCIUM mg/dL 9.5  --  10.0   ALK PHOS U/L  --   --  84   ALT (SGPT) U/L  --   --  14   AST (SGOT) U/L  --   --  21   HSTROP T ng/L  --  10 7     Estimated Creatinine Clearance: 115.2 mL/min (by C-G formula based on SCr of 0.9 mg/dL).  Brief Urine Lab Results  (Last result in the past 365 days)        Color   Clarity   Blood   Leuk Est   Nitrite   Protein   CREAT   Urine HCG        01/31/25 2047 Yellow   Clear   Negative   Negative   Negative   Negative                   Microbiology Results (last 10 days)       ** No results found for the last 240 hours. **            ECG/EMG Results (most recent)       Procedure Component Value Units Date/Time    ECG 12 Lead [[id number]] Resulted: 01/31/25 1257     Updated: 01/31/25 1257    Telemetry Scan [[id number]] Resulted: 01/31/25     Updated: 01/31/25 1638    ECG 12 Lead Chest Pain [[id number]] Collected: 01/31/25 0929     Updated: 02/01/25 1014     QT Interval 375 ms      QTC Interval 432 ms     Narrative:      HEART RATE=80  bpm  RR Cviboobl=622  ms  SC Interval=160  ms  P Horizontal Axis=37  deg  P Front Axis=56  deg  QRSD Interval=86  ms  QT Uecydloe=744  ms  YRfC=407  ms  QRS Axis=36  deg  T Wave Axis=60  deg  - NORMAL ECG -  Sinus rhythm  No previous ECG available for comparison  Electronically Signed By: Ilia Ibarra (Torito) 2025-02-01 10:14:18  Date and Time of Study:2025-01-31 09:29:08                    CT Abdomen Pelvis With Contrast    Result Date: 1/31/2025  Impression: 1.Cholelithiasis without CT evidence of acute cholecystitis. If important to further assess cystic duct patency, nuclear medicine hepatobiliary study might be useful. 2.There are 2 nonspecific hepatic lesions as detailed above for which nonemergent follow-up hepatic MRI with and without contrast is recommended. 3.Nonobstructive right nephrolithiasis. Electronically Signed: Felice Le MD  1/31/2025 10:57 AM EST  Workstation ID: [id number]       Estimated Creatinine  Clearance: 115.2 mL/min (by C-G formula based on SCr of 0.9 mg/dL).    Assessment & Plan   Assessment/Plan       Active Hospital Problems    Diagnosis  POA    **Epigastric pain [R10.13]  Yes      Resolved Hospital Problems   No resolved problems to display.            Epigastric pain/ Cholelithiasis            Lab Results   Component Value Date     WBC 10.41 02/01/2025     AST 21 01/31/2025     ALT 14 01/31/2025     ALKPHOS 84 01/31/2025     BILITOT 0.4 01/31/2025     LIPASE 19 01/31/2025   -Troponin 7, 10  -CMP and CBC generally unremarkable  -WBC 12.29 yesterday and 10.41 today  -EKG showed sinus rhythm with heart rate 80, MN interval 160 and QT C432  -CT of abdomen and pelvis: Showed cholelithiasis without evidence of cholecystitis  -In the ED patient given Benadryl Dilaudid, Zofran, Compazine  -General Surgeon consulted  -Clear liquid diet  -N.p.o at midnight.   -Plans to have lap tristin later today.   -Hopefully tonight if ok with surgeon.      Diabetes mellitus  -Poorly controlled             Lab Results   Component Value Date     GLUCOSE 114 (H) 02/01/2025     GLUCOSE 204 (H) 01/31/2025     GLUCOSE 142 (H) 05/29/2024     GLUCOSE 182 (H) 12/12/2021   -A1c 7.43  -Hold metformin  -SSI  -Diabetic diet  -Monitor before meals and at bedtime                 VTE Prophylaxis - No Active Pharmacologic or Mechanical VTE Prophylaxis AND No VTE Prophylaxis Contraindications Documented   - Select Appropriate VTE Prophylaxis      CODE STATUS:    Code Status and Medical Interventions: CPR (Attempt to Resuscitate); Full Support   Ordered at: 01/31/25 1341     Level Of Support Discussed With:    Patient     Code Status (Patient has no pulse and is not breathing):    CPR (Attempt to Resuscitate)     Medical Interventions (Patient has pulse or is breathing):    Full Support       This patient has been examined wearing personal protective equipment.     I discussed the patient's findings and my recommendations with patient and  nursing staff.      Signature:Electronically signed by MARGIE Farmer, 02/02/25, 7:20 AM EST.

## 2025-02-02 NOTE — OUTREACH NOTE
Prep Survey      Flowsheet Row Responses   [facility] patient discharged from? [facility]   Is LACE score < 7 ? Yes   Eligibility [facility]   Date of Admission 01/31/25   Date of Discharge 02/01/25   Discharge Disposition Home or Self Care   Discharge diagnosis Epigastric pain   Does the patient have one of the following disease processes/diagnoses(primary or secondary)? Other   Does the patient have Home health ordered? No   Is there a DME ordered? No   Prep survey completed? Yes            CAROLYN SAUCEDA - Registered Nurse           Diarrhea, unspecified type

## 2025-02-03 ENCOUNTER — TELEPHONE (OUTPATIENT)
Dept: SURGERY | Facility: CLINIC | Age: 55
End: 2025-02-03

## 2025-02-03 ENCOUNTER — TRANSITIONAL CARE MANAGEMENT TELEPHONE ENCOUNTER (OUTPATIENT)
Dept: CALL CENTER | Facility: HOSPITAL | Age: 55
End: 2025-02-03

## 2025-02-03 NOTE — PROGRESS NOTES
Patient called 2/3.  Said the nurse at the hospital told him he could go home and had him sign a paper saying he was released.  He said she told him to let her know when he was leaving, but he couldn't find her when he left.  He was trying to set up a hospital f/u with Dr. Acosta (who has no availability). Then he brought up questions about whether or not he should be on antibiotics and when could he get the stitches out.  I told him that he would need to follow up with the surgeon to get those questions answered.  I gave him the phone number to the surgeon's office and told him to call them to get the answers he needed and to schedule a follow up appointment with them.  Then he could call us back to set up a hospital f/u with this office.

## 2025-02-03 NOTE — OUTREACH NOTE
Call Center TCM Note      Flowsheet Row Responses   [facility] patient discharged from? [facility]   Does the patient have one of the following disease processes/diagnoses(primary or secondary)? Other   TCM attempt successful? No   Unsuccessful attempts Attempt 1   Revoked Reason Other  [Patient left AMA. Revoked per unit policy.]   Does the patient have an appointment with their PCP within 7-14 days of discharge? No   [facility]urs[facility] PCP office requested to make appointment - message sent, Routed TCM call to PCP office            Jo Santiago RN    2/3/2025, 15:19 EST

## 2025-02-03 NOTE — PROGRESS NOTES
[facility] f/u scheduled for 2/19 with Florence.  This is after the follow up appt with the surgeon.

## 2025-02-03 NOTE — TELEPHONE ENCOUNTER
Hub staff attempted to follow warm transfer process and was unsuccessful     Caller: YOLI PERERA    Relationship to patient: SELF    Best call back number: [phone]    Patient is needing: PT CALLED TO SEE WHAT HIS DISCHARGED CARE WOULD BE AS HE WAS NOT GIVEN ANY WHEN HE LEFT ON 2-1-25. PT ALSO ASKED IF HE SHOULD BE ON A ANTIBIOTIC? I DIDN'T SEE ANY DISCHARGE CARE THAT I COULD GO OVER WITH HIM IN HIS CHART.    PLEASE CALL PT.      PT HAD CHOLECYSTECTOMY LAPAROSCOPIC WITH DAVINCI ROBOT ON  2-1-25    FYI    NOTE FROM HOSPITAL    Hospital Course  Patient is a 54 y.o. male presented with epigastric pain as noted on HPI above.  Troponin, CMP, CBC, EKG generally unremarkable.  WBC mildly elevated 12.29 and down to 10.41.  CT abdomen pelvis showed cholelithiasis without cholecystitis.  Patient was admitted to the observation unit and general surgeon was consulted.  He underwent laparoscopic cholecystectomy on 02/01 and surgeon recommended to monitor overnight and likely dc on 02/02. However, patient left AMA on 02/01 after returned to the floor from OR. RN discussed risks with patient.

## 2025-02-04 ENCOUNTER — TELEPHONE (OUTPATIENT)
Dept: SURGERY | Facility: CLINIC | Age: 55
End: 2025-02-04

## 2025-02-04 LAB
LAB AP CASE REPORT: NORMAL
PATH REPORT.FINAL DX SPEC: NORMAL
PATH REPORT.GROSS SPEC: NORMAL

## 2025-02-04 NOTE — TELEPHONE ENCOUNTER
Called and spoke with patient.  Patient requested PO instructions.  Patient advised not to lift, push or pull greater than 10 pounds.  Advised no bath or submerging in water for 2 weeks.  He may shower after 48 hours but do not left water hit directly on incisions.  No driving for 48 hours or while taking narcotic medication.    Patient stated he did take a shower after 24 hours.    Patient reports doing well.  He has experienced pain in shoulders and was advised to use Gas-X.    PO scheduled with Sylvia.

## 2025-02-13 ENCOUNTER — OFFICE VISIT (OUTPATIENT)
Dept: SURGERY | Facility: CLINIC | Age: 55
End: 2025-02-13

## 2025-02-13 VITALS
WEIGHT: 227 LBS | HEART RATE: 83 BPM | DIASTOLIC BLOOD PRESSURE: 90 MMHG | OXYGEN SATURATION: 97 % | HEIGHT: 70 IN | SYSTOLIC BLOOD PRESSURE: 124 MMHG | BODY MASS INDEX: 32.5 KG/M2 | TEMPERATURE: 97.3 F

## 2025-02-13 DIAGNOSIS — Z90.49 S/P CHOLECYSTECTOMY: Primary | ICD-10-CM

## 2025-02-13 NOTE — PROGRESS NOTES
"Chief Complaint  Post-op Follow-up (FOLLOW UP-CHOLECYSTECTOMY LAPAROSCOPIC WITH DAVINCI ROBOT 2-1-25)    Subjective        Pablo Solano presents to [facility] GENERAL SURGERY status post robotic cholecystectomy with Dr. Cade on 2/1/2025.  Overall doing well, does report some gas pain this afternoon.  Denies fevers and chills.  Tolerating regular diet without nausea or vomiting.  Having regular bowel function.      Objective   Vital Signs:  /90 (BP Location: Left arm, Patient Position: Sitting, Cuff Size: Large Adult)   Pulse 83   Temp 97.3 °F (36.3 °C) (Infrared)   Ht 177.8 cm (70\")   Wt 103 kg (227 lb)   SpO2 97%   BMI 32.57 kg/m²   Estimated body mass index is 32.57 kg/m² as calculated from the following:    Height as of this encounter: 177.8 cm (70\").    Weight as of this encounter: 103 kg (227 lb).            Physical Exam  Constitutional:       General: He is not in acute distress.  Cardiovascular:      Rate and Rhythm: Normal rate.   Pulmonary:      Effort: Pulmonary effort is normal. No respiratory distress.   Abdominal:      General: There is no distension.      Palpations: Abdomen is soft.      Tenderness: There is abdominal tenderness. There is no guarding.      Comments: Some tenderness to palpation and swelling noted at gallbladder extraction site.  Incisions appear to be healing well.   Neurological:      Mental Status: He is alert. Mental status is at baseline.   Psychiatric:         Mood and Affect: Mood normal.         Behavior: Behavior normal.        Result Review :                Assessment and Plan   Diagnoses and all orders for this visit:    1. S/P cholecystectomy (Primary)    status post robotic cholecystectomy with Dr. Cade on 2/1/2025.  Overall doing well, does report some gas pain this afternoon.  Denies fevers and chills.  Tolerating regular diet without nausea or vomiting.  Having regular bowel function.  Pathology discussed with patient.  Discussed " operative note in detail with patient.  No further physical restrictions.  No dietary restrictions.  Can follow-up as needed.         Follow Up   No follow-ups on file.  Patient was given instructions and counseling regarding his condition or for health maintenance advice. Please see specific information pulled into the AVS if appropriate.

## 2025-02-19 ENCOUNTER — OFFICE VISIT (OUTPATIENT)
Dept: FAMILY MEDICINE CLINIC | Facility: CLINIC | Age: 55
End: 2025-02-19
Payer: COMMERCIAL

## 2025-02-19 VITALS
DIASTOLIC BLOOD PRESSURE: 87 MMHG | WEIGHT: 228 LBS | OXYGEN SATURATION: 96 % | HEIGHT: 70 IN | TEMPERATURE: 98.2 F | HEART RATE: 88 BPM | BODY MASS INDEX: 32.64 KG/M2 | SYSTOLIC BLOOD PRESSURE: 127 MMHG

## 2025-02-19 DIAGNOSIS — E11.9 TYPE 2 DIABETES MELLITUS WITHOUT COMPLICATION, WITHOUT LONG-TERM CURRENT USE OF INSULIN: ICD-10-CM

## 2025-02-19 DIAGNOSIS — Z90.49 S/P LAPAROSCOPIC CHOLECYSTECTOMY: ICD-10-CM

## 2025-02-19 DIAGNOSIS — Z09 HOSPITAL DISCHARGE FOLLOW-UP: Primary | ICD-10-CM

## 2025-02-19 PROCEDURE — 99213 OFFICE O/P EST LOW 20 MIN: CPT

## 2025-02-19 RX ORDER — TIRZEPATIDE 2.5 MG/.5ML
2.5 INJECTION, SOLUTION SUBCUTANEOUS WEEKLY
Qty: 3 ML | Refills: 0 | Status: SHIPPED | OUTPATIENT
Start: 2025-02-19

## 2025-02-19 NOTE — PROGRESS NOTES
"Transitional Care Follow Up Visit  Subjective     Pablo Solano is a 54 y.o. male who presents for a transitional care management visit.    Within 48 business hours after discharge our office contacted him via telephone to coordinate his care and needs.      I reviewed and discussed the details of that call along with the discharge summary, hospital problems, inpatient lab results, inpatient diagnostic studies, and consultation reports with Pablo.     Current outpatient and discharge medications have been reconciled for the patient.  Reviewed by: MARGIE Guan          2/1/2025     8:34 PM   Date of TCM Phone Call   [facility]   Date of Admission 1/31/2025   Date of Discharge 2/1/2025   Discharge Disposition Home or Self Care     Risk for Readmission (LACE) No data recorded    History of Present Illness   Course During Hospital Stay:      \"Patient is a 54 y.o. male presented with epigastric pain as noted on HPI above.  Troponin, CMP, CBC, EKG generally unremarkable.  WBC mildly elevated 12.29 and down to 10.41.  CT abdomen pelvis showed cholelithiasis without cholecystitis.  Patient was admitted to the observation unit and general surgeon was consulted.  He underwent laparoscopic cholecystectomy on 02/01 and surgeon recommended to monitor overnight and likely dc on 02/02. However, patient left AMA on 02/01 after returned to the floor from OR. RN discussed risks with patient.\"     Pt presents today for follow up on 01/31 hospitalization for calculus of gallbladder. He ended up having a cholecystectomy on 02/01. He is healing well and feels good. Never had much pain only took tylenol,  however, he is a  for work. He is worried about the possibility of being hit in the stomach since its only been 2 weeks from his surgery.    He did not think he was spending the night in the hospital after surgery.  When he woke up and they told him he had this MRI he asked if he had " "the option to leave.  The nurse gave him AMA papers, he did not realize what he was signing.  She did not explain anything.  He just thought he had the option to leave earlier if he wanted to.  Now he is worried that insurance will not pay for his surgery.    The following portions of the patient's history were reviewed and updated as appropriate: allergies, current medications, past family history, past medical history, past social history, past surgical history, and problem list.    Review of Systems    Objective   /87 (BP Location: Left arm, Patient Position: Sitting, Cuff Size: Large Adult)   Pulse 88   Temp 98.2 °F (36.8 °C) (Temporal)   Ht 177.8 cm (70\")   Wt 103 kg (228 lb)   SpO2 96%   BMI 32.71 kg/m²   Physical Exam  Vitals reviewed.   Constitutional:       General: He is not in acute distress.     Appearance: Normal appearance. He is not ill-appearing, toxic-appearing or diaphoretic.   Cardiovascular:      Rate and Rhythm: Normal rate and regular rhythm.      Pulses: Normal pulses.      Heart sounds: Normal heart sounds.   Pulmonary:      Effort: Pulmonary effort is normal. No respiratory distress.      Breath sounds: Normal breath sounds.   Skin:     General: Skin is warm and dry.      Capillary Refill: Capillary refill takes less than 2 seconds.      Comments: Healing incisions on abdomen   Neurological:      General: No focal deficit present.      Mental Status: He is alert and oriented to person, place, and time.   Psychiatric:         Mood and Affect: Mood normal.         Behavior: Behavior normal.         Thought Content: Thought content normal.         Judgment: Judgment normal.         Assessment & Plan   Diagnoses and all orders for this visit:    1. Hospital discharge follow-up (Primary)    2. S/P laparoscopic cholecystectomy  Comments:  -pt doing well  -no work for 4-6 weeks with the unpredictability of his job.    3. Type 2 diabetes mellitus without complication, without long-term " current use of insulin  Comments:  -Dr. Acosta had tried to send him in mounjaro but wasnt covered, now he has different insurance and wants to see if it is covered.  Orders:  -     Tirzepatide (Mounjaro) 2.5 MG/0.5ML solution auto-injector; Inject 2.5 mg under the skin into the appropriate area as directed 1 (One) Time Per Week.  Dispense: 3 mL; Refill: 0

## (undated) DEVICE — 3M™ STERI-STRIP™ REINFORCED ADHESIVE SKIN CLOSURES, R1547, 1/2 IN X 4 IN (12 MM X 100 MM), 6 STRIPS/ENVELOPE: Brand: 3M™ STERI-STRIP™

## (undated) DEVICE — COLUMN DRAPE

## (undated) DEVICE — BG RETRV TISS SUPERBAG INTRO RIP/STOP NLY 10MM 240ML MD

## (undated) DEVICE — KT SURG TURNOVER 050

## (undated) DEVICE — BLANKT WARM UPPR/BDY ARM/OUT 57X196CM

## (undated) DEVICE — SUT VIC 0 UR6 27IN VCP603H

## (undated) DEVICE — UNDERGLV SURG BIOGEL INDICATOR LF PF 7.5

## (undated) DEVICE — ANTIBACTERIAL UNDYED BRAIDED (POLYGLACTIN 910), SYNTHETIC ABSORBABLE SUTURE: Brand: COATED VICRYL

## (undated) DEVICE — ENDOPATH XCEL WITH OPTIVIEW TECHNOLOGY BLADELESS TROCARS WITH STABILITY SLEEVES: Brand: ENDOPATH XCEL OPTIVIEW

## (undated) DEVICE — ABSORBABLE WOUND CLOSURE DEVICE
Type: IMPLANTABLE DEVICE | Site: ABDOMEN | Status: NON-FUNCTIONAL
Brand: V-LOC 180

## (undated) DEVICE — LAPAROVUE VISIBILITY SYSTEM LAPAROSCOPIC SOLUTIONS: Brand: LAPAROVUE

## (undated) DEVICE — 3M™ IOBAN™ 2 ANTIMICROBIAL INCISE DRAPE 6650EZ: Brand: IOBAN™ 2

## (undated) DEVICE — CFF SCD HEMFRCE SEQ CLF STD XL

## (undated) DEVICE — SOL IRR NACL 0.9PCT BO 1000ML

## (undated) DEVICE — SYR LUERLOK 30CC

## (undated) DEVICE — SOLUTION,WATER,IRRIGATION,1000ML,STERILE: Brand: MEDLINE

## (undated) DEVICE — ST TBG AIRSEAL BIF FLTR W/ACT/CHARCOAL/FLTR

## (undated) DEVICE — PASS SUT PRO BARIATRIC XL W/TROC SWABS

## (undated) DEVICE — GENERAL LAPAROSCOPY CDS: Brand: MEDLINE INDUSTRIES, INC.

## (undated) DEVICE — SUCTION IRRIGATOR: Brand: ENDOWRIST

## (undated) DEVICE — THE STERILE LIGHT HANDLE COVER IS USED WITH STERIS SURGICAL LIGHTING AND VISUALIZATION SYSTEMS.

## (undated) DEVICE — ARM DRAPE

## (undated) DEVICE — REDUCER: Brand: ENDOWRIST

## (undated) DEVICE — CANNULA SEAL

## (undated) DEVICE — TROC BLADLES AIRSEAL/OPTI THRD 8X120MM 1P/U

## (undated) DEVICE — BLADELESS OBTURATOR: Brand: WECK VISTA

## (undated) DEVICE — THE STERILE CAMERA HANDLE COVER IS FOR USE WITH THE STERIS SURGICAL LIGHTING AND VISUALIZATION SYSTEMS.

## (undated) DEVICE — TIP COVER ACCESSORY

## (undated) DEVICE — SEAL

## (undated) DEVICE — GLV SURG BIOGEL LTX PF 7